# Patient Record
Sex: MALE | Race: WHITE | Employment: UNEMPLOYED | URBAN - METROPOLITAN AREA
[De-identification: names, ages, dates, MRNs, and addresses within clinical notes are randomized per-mention and may not be internally consistent; named-entity substitution may affect disease eponyms.]

---

## 2021-06-14 ENCOUNTER — OFFICE VISIT (OUTPATIENT)
Dept: GASTROENTEROLOGY | Age: 56
End: 2021-06-14
Payer: MEDICARE

## 2021-06-14 VITALS — HEART RATE: 124 BPM | SYSTOLIC BLOOD PRESSURE: 122 MMHG | RESPIRATION RATE: 18 BRPM | DIASTOLIC BLOOD PRESSURE: 81 MMHG

## 2021-06-14 DIAGNOSIS — Z12.11 COLON CANCER SCREENING: ICD-10-CM

## 2021-06-14 DIAGNOSIS — K22.10 ESOPHAGITIS, EROSIVE: Primary | ICD-10-CM

## 2021-06-14 PROBLEM — R33.9 RETENTION OF URINE: Status: ACTIVE | Noted: 2021-03-26

## 2021-06-14 PROBLEM — R62.7 FTT (FAILURE TO THRIVE) IN ADULT: Status: ACTIVE | Noted: 2019-04-01

## 2021-06-14 PROBLEM — F79 MENTALLY DISABLED: Status: ACTIVE | Noted: 2020-01-20

## 2021-06-14 PROBLEM — F20.89 OTHER SCHIZOPHRENIA (HCC): Status: ACTIVE | Noted: 2018-03-26

## 2021-06-14 PROBLEM — K92.2 GI BLEED: Status: ACTIVE | Noted: 2021-03-18

## 2021-06-14 PROBLEM — K59.00 CONSTIPATION: Status: ACTIVE | Noted: 2019-04-01

## 2021-06-14 PROBLEM — R29.6 FREQUENT FALLS: Status: ACTIVE | Noted: 2020-01-20

## 2021-06-14 PROBLEM — K20.90 ESOPHAGITIS: Status: ACTIVE | Noted: 2021-03-21

## 2021-06-14 PROCEDURE — G8421 BMI NOT CALCULATED: HCPCS | Performed by: INTERNAL MEDICINE

## 2021-06-14 PROCEDURE — 99214 OFFICE O/P EST MOD 30 MIN: CPT | Performed by: INTERNAL MEDICINE

## 2021-06-14 PROCEDURE — G8427 DOCREV CUR MEDS BY ELIG CLIN: HCPCS | Performed by: INTERNAL MEDICINE

## 2021-06-14 PROCEDURE — 3017F COLORECTAL CA SCREEN DOC REV: CPT | Performed by: INTERNAL MEDICINE

## 2021-06-14 PROCEDURE — 1036F TOBACCO NON-USER: CPT | Performed by: INTERNAL MEDICINE

## 2021-06-14 RX ORDER — SUCRALFATE ORAL 1 G/10ML
1000 SUSPENSION ORAL
COMMUNITY
Start: 2021-05-06

## 2021-06-14 RX ORDER — CLOZAPINE 100 MG/1
100 TABLET ORAL NIGHTLY
COMMUNITY
Start: 2021-03-04

## 2021-06-14 RX ORDER — ATENOLOL 25 MG/1
TABLET ORAL
COMMUNITY
Start: 2021-05-03 | End: 2021-10-25

## 2021-06-14 RX ORDER — LANOLIN ALCOHOL/MO/W.PET/CERES
325 CREAM (GRAM) TOPICAL 2 TIMES DAILY
COMMUNITY
Start: 2021-05-06 | End: 2021-10-25

## 2021-06-14 RX ORDER — M-VIT,TX,IRON,MINS/CALC/FOLIC 27MG-0.4MG
1 TABLET ORAL DAILY
COMMUNITY

## 2021-06-14 RX ORDER — LOVASTATIN 20 MG/1
20 TABLET ORAL DAILY
COMMUNITY
Start: 2020-09-01

## 2021-06-14 RX ORDER — BENZTROPINE MESYLATE 2 MG/1
2 TABLET ORAL 2 TIMES DAILY
COMMUNITY

## 2021-06-14 RX ORDER — PANTOPRAZOLE SODIUM 40 MG/1
40 TABLET, DELAYED RELEASE ORAL
COMMUNITY
Start: 2021-05-06 | End: 2021-10-25

## 2021-06-14 RX ORDER — TAMSULOSIN HYDROCHLORIDE 0.4 MG/1
CAPSULE ORAL
COMMUNITY
Start: 2021-05-13 | End: 2021-10-25

## 2021-06-14 RX ORDER — OMEPRAZOLE 20 MG/1
20 CAPSULE, DELAYED RELEASE ORAL DAILY
COMMUNITY

## 2021-06-14 ASSESSMENT — ENCOUNTER SYMPTOMS
RECTAL PAIN: 0
DIARRHEA: 0
TROUBLE SWALLOWING: 0
BLOOD IN STOOL: 0
CHOKING: 0
WHEEZING: 0
ANAL BLEEDING: 0
NAUSEA: 0
VOMITING: 1
CONSTIPATION: 0
COUGH: 0
ABDOMINAL DISTENTION: 0
ABDOMINAL PAIN: 0

## 2021-06-14 NOTE — PROGRESS NOTES
GI CLINIC FOLLOW UP    INTERVAL HISTORY:   No referring provider defined for this encounter. Chief Complaint   Patient presents with    GI Bleeding     Patient is f/u on hospital visit, EGD, GI bleed           HISTORY OF PRESENT ILLNESS: Chanell Alonzo is a 60 y.o. male , referred for evaluation of*severe esophagitis and GI bleed  Patient is MRDD not able to give any history  We have seen a month by [facility] hospital with anemia  GI bleeding  EGD was done as below showed severe grade 4 reflux esophagitis  Patient was treated with PPI and Carafate  His repeat CBC on 6/9/2021 showed that his hemoglobin right now is normal at 13.2  Patient is not able to give me history  But from the caregiver seems like he is not having any abdominal pain is not having any dysphagia or odynophagia has not had any sign of bleeding does not have any fever or chills      Labs 6/9/2022:   normal h/h 13.2 hgb       PREOPERATIVE DIAGNOSIS: CC coffee-ground emesis drop in hemoglobin    POSTOPERATIVE DIAGNOSIS: Severe esophagitis grade 4 reflux esophagitis biopsies were taken very fragile mucosa    OPERATION: Upper GI endoscopy with Biopsy    ANESTHESIA: Moderate Sedation     ESTIMATED BLOOD LOSS: Less than 50 ml    COMPLICATIONS: None. SPECIMENS: was obtained    HISTORY: The patient is a 60y.o. year old male with history of above preop diagnosis. I recommended esophagogastroduodenoscopy with possible biopsy and I explained the risk, benefits, expected outcome, and alternatives to the procedure. Risks included but are not limited to bleeding, infection, respiratory distress, hypotension, and perforation of the esophagus, stomach, or duodenum. Patient understands and is in agreement. Informed consent was obtained for the procedure, including sedation. Risks of perforation, hemorrhage, adverse drug reaction and aspiration were discussed. The patient was placed in the left lateral decubitus position.  Based on the pre-procedure assessment, including review of the patient's medical history, medications, allergies, and review of systems, he had been deemed to be an appropriate candidate for conscious sedation; he was therefore sedated with the medications listed below. The patient was monitored continuously with ECG tracing, pulse oximetry, blood pressure monitoring, and direct observations. PROCEDURE: The patient was given IV conscious sedation. The patient's SPO2 remained above 90% throughout the procedure. The gastroscope was inserted orally and advanced under direct vision through the esophagus, through the stomach, through the pylorus, and into the descending duodenum. Findings:    Retropharyngeal area was grossly normal appearing    Esophagus: abnormal: Severe esophagitis grade 4 reflux esophagitis biopsies were taken very fragile mucosa    Stomach:  Fundus: abnormal: Small hiatal hernia  Body: abnormal: Retain coffee-ground stuff  Antrum: normal    Duodenum:   Descending: normal  Bulb: normal    The scope was removed and the patient tolerated the procedure well. Recommendations/Plan:   1. F/U Biopsies  2. Proton pump inhibitor  3. Carafate  4. H&H monitoring  5. Iron check and supplement if needed  6. May be discharged back to the group home tomorrow of there is no bleeding    Electronically signed by Andres Wyatt MD on 3/19/2021 at 1:11 PM       Final Pathologic Diagnosis    Esophagus, biopsy:        Ulcer.        Comment: Immunohistochemical stain for CMV infection and GMS stain for fungal infection are negative with adequate control.                 **Report Electronically Signed Out**    rg/3/23/2021 Jina Garcia MD       Past Medical,Family, and Social History reviewed and does contribute to the patient presentingcondition. Patient's PMH/PSH,SH,PSYCH Hx, MEDs, ALLERGIES, and ROS were all reviewed and updated in the appropriate sections. PAST MEDICAL HISTORY:  History reviewed.  No pertinent past medical history. History reviewed. No pertinent surgical history.     CURRENT MEDICATIONS:    Current Outpatient Medications:     benztropine (COGENTIN) 2 MG tablet, Take 2 mg by mouth 2 times daily, Disp: , Rfl:     cloZAPine (CLOZARIL) 100 MG tablet, Take 100 mg by mouth nightly, Disp: , Rfl:     ferrous sulfate (FE TABS 325) 325 (65 Fe) MG EC tablet, Take 325 mg by mouth 2 times daily, Disp: , Rfl:     lovastatin (MEVACOR) 20 MG tablet, Take 20 mg by mouth daily, Disp: , Rfl:     sucralfate (CARAFATE) 1 GM/10ML suspension, Take 1,000 mg by mouth, Disp: , Rfl:     Multiple Vitamins-Minerals (THERAPEUTIC MULTIVITAMIN-MINERALS) tablet, Take 1 tablet by mouth daily, Disp: , Rfl:     omeprazole (PRILOSEC) 20 MG delayed release capsule, Take 20 mg by mouth daily, Disp: , Rfl:     atenolol (TENORMIN) 25 MG tablet, , Disp: , Rfl:     pantoprazole (PROTONIX) 40 MG tablet, Take 40 mg by mouth 2 times daily (before meals) (Patient not taking: Reported on 6/14/2021), Disp: , Rfl:     tamsulosin (FLOMAX) 0.4 MG capsule, , Disp: , Rfl:     ALLERGIES:   No Known Allergies    FAMILY HISTORY:       Family history unknown: Yes         SOCIAL HISTORY:   Social History     Socioeconomic History    Marital status: Single     Spouse name: Not on file    Number of children: Not on file    Years of education: Not on file    Highest education level: Not on file   Occupational History    Not on file   Tobacco Use    Smoking status: Former Smoker    Smokeless tobacco: Never Used   Vaping Use    Vaping Use: Never used   Substance and Sexual Activity    Alcohol use: Not Currently    Drug use: Not on file    Sexual activity: Not on file   Other Topics Concern    Not on file   Social History Narrative    Not on file     Social Determinants of Health     Financial Resource Strain:     Difficulty of Paying Living Expenses:    Food Insecurity:     Worried About Running Out of Food in the Last Year:     [employer] of [employer] in the Last Year:    Transportation Needs:     Lack of Transportation (Medical):  Lack of Transportation (Non-Medical):    Physical Activity:     Days of Exercise per Week:     Minutes of Exercise per Session:    Stress:     Feeling of Stress :    Social Connections:     Frequency of Communication with Friends and Family:     Frequency of Social Gatherings with Friends and Family:     Attends Zoroastrianism Services:     Active Member of Clubs or Organizations:     Attends Club or Organization Meetings:     Marital Status:    Intimate Partner Violence:     Fear of Current or Ex-Partner:     Emotionally Abused:     Physically Abused:     Sexually Abused:        REVIEW OF SYSTEMS: A 12-point review of systemswas obtained and pertinent positives and negatives were enumerated above in the history of present illness. All other reviewed systems / symptoms were negative. Review of Systems   Constitutional: Negative for appetite change, fatigue and unexpected weight change. HENT: Negative for trouble swallowing. Respiratory: Negative for cough, choking and wheezing. Cardiovascular: Negative for chest pain, palpitations and leg swelling. Gastrointestinal: Positive for vomiting. Negative for abdominal distention, abdominal pain, anal bleeding, blood in stool, constipation, diarrhea, nausea and rectal pain. Genitourinary: Negative for difficulty urinating. Allergic/Immunologic: Negative for environmental allergies and food allergies. Neurological: Negative for dizziness, weakness, light-headedness, numbness and headaches. Hematological: Does not bruise/bleed easily. Psychiatric/Behavioral: Negative for sleep disturbance. The patient is nervous/anxious.             LABORATORY DATA: Reviewed  No results found for: WBC, HGB, HCT, MCV, PLT, NA, K, CL, CO2, BUN, CREATININE, LABPROT, LABALBU, GGT, BILITOT, ALKPHOS, AST, ALT, INR      No results found for: RBC, HGB, MCV, MCH, MCHC, RDW, MPV, continue to watch H&H carefully  Proceed with an EGD confirm eradication of the severe esophagitis he has  We will schedule him for colonoscopy screening    Diet/life style/natural hx /complication of the dx were all explained in details   Past medical, past surgical, social history, psychiatric history, medications or allergies, all reviewed and  updated    Thank you for allowing me to participate in the care of Mr. Damaris Hendrix. For any further questions please do not hesitate to contact me. I have reviewed and agree with the ROS entered by the MA/RN. Note is dictated utilizing voice recognition software. Unfortunately this leads to occasional typographical errors. Please contact our office if you have any questions.       Funmi Seo MD  [facility]  O: #[phone]

## 2021-06-16 ENCOUNTER — TELEPHONE (OUTPATIENT)
Dept: GASTROENTEROLOGY | Age: 56
End: 2021-06-16

## 2021-06-16 RX ORDER — POLYETHYLENE GLYCOL 3350, SODIUM SULFATE ANHYDROUS, SODIUM BICARBONATE, SODIUM CHLORIDE, POTASSIUM CHLORIDE 236; 22.74; 6.74; 5.86; 2.97 G/4L; G/4L; G/4L; G/4L; G/4L
4 POWDER, FOR SOLUTION ORAL ONCE
Qty: 4000 ML | Refills: 0 | Status: SHIPPED | OUTPATIENT
Start: 2021-06-16 | End: 2021-06-16

## 2021-06-16 NOTE — TELEPHONE ENCOUNTER
Writer spoke to Billy from [facility] in regards to scheduling EGD/colon proc that was ordered at [address] on 6/14/21. Writer confirmed w/ Billy pt is not on any blood thinners, has no kidney disease and pt has been vaccinated for the Covid-19 virus. Billy states to just schedule procedures and fax over information and they will set everything up. She asks that we fax to two fax numbers: [phone]. Pt is sched w/ Igne at [facility] Monday 7/19/21 @ 9:15am proc time, 7:45am arrival time. Golytely order will be faxed with bowel prep instructions. Pt will need  and someone to accompany him to procedure. Pt also to bring proof of Covid-19 vaccinations on the day of procedure. All of this is noted on bowel prep instructions and is faxed to care facility.

## 2021-07-06 NOTE — TELEPHONE ENCOUNTER
Lia Jackson at [facility] asked for a return call needing the procedure time for Merna Getting to set up transportation. She can be reached at [phone].

## 2021-07-15 ENCOUNTER — ANESTHESIA EVENT (OUTPATIENT)
Dept: OPERATING ROOM | Age: 56
End: 2021-07-15
Payer: MEDICARE

## 2021-07-19 ENCOUNTER — HOSPITAL ENCOUNTER (OUTPATIENT)
Age: 56
Setting detail: OUTPATIENT SURGERY
Discharge: OTHER INSTITUTION | End: 2021-07-19
Attending: INTERNAL MEDICINE | Admitting: INTERNAL MEDICINE
Payer: MEDICARE

## 2021-07-19 ENCOUNTER — ANESTHESIA (OUTPATIENT)
Dept: OPERATING ROOM | Age: 56
End: 2021-07-19
Payer: MEDICARE

## 2021-07-19 VITALS
DIASTOLIC BLOOD PRESSURE: 76 MMHG | SYSTOLIC BLOOD PRESSURE: 126 MMHG | HEIGHT: 66 IN | OXYGEN SATURATION: 98 % | WEIGHT: 149.4 LBS | RESPIRATION RATE: 16 BRPM | TEMPERATURE: 97 F | BODY MASS INDEX: 24.01 KG/M2 | HEART RATE: 81 BPM

## 2021-07-19 VITALS
DIASTOLIC BLOOD PRESSURE: 76 MMHG | OXYGEN SATURATION: 99 % | RESPIRATION RATE: 23 BRPM | SYSTOLIC BLOOD PRESSURE: 119 MMHG | TEMPERATURE: 96.8 F

## 2021-07-19 PROCEDURE — 7100000001 HC PACU RECOVERY - ADDTL 15 MIN: Performed by: INTERNAL MEDICINE

## 2021-07-19 PROCEDURE — 3700000001 HC ADD 15 MINUTES (ANESTHESIA): Performed by: INTERNAL MEDICINE

## 2021-07-19 PROCEDURE — 7100000011 HC PHASE II RECOVERY - ADDTL 15 MIN: Performed by: INTERNAL MEDICINE

## 2021-07-19 PROCEDURE — 3609012400 HC EGD TRANSORAL BIOPSY SINGLE/MULTIPLE: Performed by: INTERNAL MEDICINE

## 2021-07-19 PROCEDURE — 3700000000 HC ANESTHESIA ATTENDED CARE: Performed by: INTERNAL MEDICINE

## 2021-07-19 PROCEDURE — 88305 TISSUE EXAM BY PATHOLOGIST: CPT

## 2021-07-19 PROCEDURE — 7100000000 HC PACU RECOVERY - FIRST 15 MIN: Performed by: INTERNAL MEDICINE

## 2021-07-19 PROCEDURE — 43239 EGD BIOPSY SINGLE/MULTIPLE: CPT | Performed by: INTERNAL MEDICINE

## 2021-07-19 PROCEDURE — G0121 COLON CA SCRN NOT HI RSK IND: HCPCS | Performed by: INTERNAL MEDICINE

## 2021-07-19 PROCEDURE — 2580000003 HC RX 258: Performed by: ANESTHESIOLOGY

## 2021-07-19 PROCEDURE — 3609027000 HC COLONOSCOPY: Performed by: INTERNAL MEDICINE

## 2021-07-19 PROCEDURE — 88342 IMHCHEM/IMCYTCHM 1ST ANTB: CPT

## 2021-07-19 PROCEDURE — 2500000003 HC RX 250 WO HCPCS: Performed by: ANESTHESIOLOGY

## 2021-07-19 PROCEDURE — 6360000002 HC RX W HCPCS: Performed by: ANESTHESIOLOGY

## 2021-07-19 PROCEDURE — 2709999900 HC NON-CHARGEABLE SUPPLY: Performed by: INTERNAL MEDICINE

## 2021-07-19 PROCEDURE — 7100000010 HC PHASE II RECOVERY - FIRST 15 MIN: Performed by: INTERNAL MEDICINE

## 2021-07-19 RX ORDER — HYDROCODONE BITARTRATE AND ACETAMINOPHEN 5; 325 MG/1; MG/1
1 TABLET ORAL PRN
Status: DISCONTINUED | OUTPATIENT
Start: 2021-07-19 | End: 2021-07-19 | Stop reason: HOSPADM

## 2021-07-19 RX ORDER — SODIUM CHLORIDE 9 MG/ML
25 INJECTION, SOLUTION INTRAVENOUS PRN
Status: DISCONTINUED | OUTPATIENT
Start: 2021-07-19 | End: 2021-07-19 | Stop reason: HOSPADM

## 2021-07-19 RX ORDER — MEPERIDINE HYDROCHLORIDE 50 MG/ML
12.5 INJECTION INTRAMUSCULAR; INTRAVENOUS; SUBCUTANEOUS EVERY 5 MIN PRN
Status: DISCONTINUED | OUTPATIENT
Start: 2021-07-19 | End: 2021-07-19 | Stop reason: HOSPADM

## 2021-07-19 RX ORDER — LIDOCAINE HYDROCHLORIDE 10 MG/ML
1 INJECTION, SOLUTION EPIDURAL; INFILTRATION; INTRACAUDAL; PERINEURAL
Status: DISCONTINUED | OUTPATIENT
Start: 2021-07-19 | End: 2021-07-19 | Stop reason: HOSPADM

## 2021-07-19 RX ORDER — DIPHENHYDRAMINE HYDROCHLORIDE 50 MG/ML
12.5 INJECTION INTRAMUSCULAR; INTRAVENOUS
Status: DISCONTINUED | OUTPATIENT
Start: 2021-07-19 | End: 2021-07-19 | Stop reason: HOSPADM

## 2021-07-19 RX ORDER — SODIUM CHLORIDE, SODIUM LACTATE, POTASSIUM CHLORIDE, CALCIUM CHLORIDE 600; 310; 30; 20 MG/100ML; MG/100ML; MG/100ML; MG/100ML
INJECTION, SOLUTION INTRAVENOUS CONTINUOUS PRN
Status: DISCONTINUED | OUTPATIENT
Start: 2021-07-19 | End: 2021-07-19 | Stop reason: SDUPTHER

## 2021-07-19 RX ORDER — SODIUM CHLORIDE, SODIUM LACTATE, POTASSIUM CHLORIDE, CALCIUM CHLORIDE 600; 310; 30; 20 MG/100ML; MG/100ML; MG/100ML; MG/100ML
INJECTION, SOLUTION INTRAVENOUS CONTINUOUS
Status: DISCONTINUED | OUTPATIENT
Start: 2021-07-19 | End: 2021-07-19 | Stop reason: HOSPADM

## 2021-07-19 RX ORDER — SODIUM CHLORIDE 0.9 % (FLUSH) 0.9 %
10 SYRINGE (ML) INJECTION EVERY 12 HOURS SCHEDULED
Status: DISCONTINUED | OUTPATIENT
Start: 2021-07-19 | End: 2021-07-19 | Stop reason: HOSPADM

## 2021-07-19 RX ORDER — ONDANSETRON 2 MG/ML
4 INJECTION INTRAMUSCULAR; INTRAVENOUS
Status: DISCONTINUED | OUTPATIENT
Start: 2021-07-19 | End: 2021-07-19 | Stop reason: HOSPADM

## 2021-07-19 RX ORDER — PROPOFOL 10 MG/ML
INJECTION, EMULSION INTRAVENOUS PRN
Status: DISCONTINUED | OUTPATIENT
Start: 2021-07-19 | End: 2021-07-19 | Stop reason: SDUPTHER

## 2021-07-19 RX ORDER — SODIUM CHLORIDE 0.9 % (FLUSH) 0.9 %
10 SYRINGE (ML) INJECTION PRN
Status: DISCONTINUED | OUTPATIENT
Start: 2021-07-19 | End: 2021-07-19 | Stop reason: HOSPADM

## 2021-07-19 RX ORDER — HYDROCODONE BITARTRATE AND ACETAMINOPHEN 5; 325 MG/1; MG/1
2 TABLET ORAL PRN
Status: DISCONTINUED | OUTPATIENT
Start: 2021-07-19 | End: 2021-07-19 | Stop reason: HOSPADM

## 2021-07-19 RX ORDER — PROMETHAZINE HYDROCHLORIDE 25 MG/ML
6.25 INJECTION, SOLUTION INTRAMUSCULAR; INTRAVENOUS
Status: DISCONTINUED | OUTPATIENT
Start: 2021-07-19 | End: 2021-07-19 | Stop reason: HOSPADM

## 2021-07-19 RX ORDER — FENTANYL CITRATE 50 UG/ML
25 INJECTION, SOLUTION INTRAMUSCULAR; INTRAVENOUS EVERY 5 MIN PRN
Status: DISCONTINUED | OUTPATIENT
Start: 2021-07-19 | End: 2021-07-19 | Stop reason: HOSPADM

## 2021-07-19 RX ORDER — LIDOCAINE HYDROCHLORIDE 10 MG/ML
INJECTION, SOLUTION EPIDURAL; INFILTRATION; INTRACAUDAL; PERINEURAL PRN
Status: DISCONTINUED | OUTPATIENT
Start: 2021-07-19 | End: 2021-07-19 | Stop reason: SDUPTHER

## 2021-07-19 RX ORDER — HYDRALAZINE HYDROCHLORIDE 20 MG/ML
5 INJECTION INTRAMUSCULAR; INTRAVENOUS EVERY 10 MIN PRN
Status: DISCONTINUED | OUTPATIENT
Start: 2021-07-19 | End: 2021-07-19 | Stop reason: HOSPADM

## 2021-07-19 RX ORDER — BUSPIRONE HYDROCHLORIDE 10 MG/1
10 TABLET ORAL 2 TIMES DAILY
COMMUNITY

## 2021-07-19 RX ORDER — MORPHINE SULFATE 1 MG/ML
1 INJECTION, SOLUTION EPIDURAL; INTRATHECAL; INTRAVENOUS EVERY 5 MIN PRN
Status: DISCONTINUED | OUTPATIENT
Start: 2021-07-19 | End: 2021-07-19 | Stop reason: HOSPADM

## 2021-07-19 RX ADMIN — PROPOFOL INJECTABLE EMULSION 30 MG: 10 INJECTION, EMULSION INTRAVENOUS at 09:53

## 2021-07-19 RX ADMIN — PROPOFOL INJECTABLE EMULSION 30 MG: 10 INJECTION, EMULSION INTRAVENOUS at 09:59

## 2021-07-19 RX ADMIN — PROPOFOL INJECTABLE EMULSION 30 MG: 10 INJECTION, EMULSION INTRAVENOUS at 10:08

## 2021-07-19 RX ADMIN — PROPOFOL INJECTABLE EMULSION 30 MG: 10 INJECTION, EMULSION INTRAVENOUS at 10:05

## 2021-07-19 RX ADMIN — LIDOCAINE HYDROCHLORIDE 50 MG: 10 INJECTION, SOLUTION EPIDURAL; INFILTRATION; INTRACAUDAL; PERINEURAL at 09:52

## 2021-07-19 RX ADMIN — PROPOFOL INJECTABLE EMULSION 30 MG: 10 INJECTION, EMULSION INTRAVENOUS at 10:20

## 2021-07-19 RX ADMIN — PROPOFOL INJECTABLE EMULSION 30 MG: 10 INJECTION, EMULSION INTRAVENOUS at 10:11

## 2021-07-19 RX ADMIN — PROPOFOL INJECTABLE EMULSION 30 MG: 10 INJECTION, EMULSION INTRAVENOUS at 10:17

## 2021-07-19 RX ADMIN — PROPOFOL INJECTABLE EMULSION 100 MG: 10 INJECTION, EMULSION INTRAVENOUS at 09:52

## 2021-07-19 RX ADMIN — PROPOFOL INJECTABLE EMULSION 30 MG: 10 INJECTION, EMULSION INTRAVENOUS at 10:02

## 2021-07-19 RX ADMIN — PROPOFOL INJECTABLE EMULSION 30 MG: 10 INJECTION, EMULSION INTRAVENOUS at 09:56

## 2021-07-19 RX ADMIN — SODIUM CHLORIDE, POTASSIUM CHLORIDE, SODIUM LACTATE AND CALCIUM CHLORIDE: 600; 310; 30; 20 INJECTION, SOLUTION INTRAVENOUS at 09:47

## 2021-07-19 RX ADMIN — PROPOFOL INJECTABLE EMULSION 30 MG: 10 INJECTION, EMULSION INTRAVENOUS at 10:14

## 2021-07-19 ASSESSMENT — PULMONARY FUNCTION TESTS
PIF_VALUE: 1
PIF_VALUE: 2
PIF_VALUE: 1
PIF_VALUE: 2
PIF_VALUE: 1

## 2021-07-19 NOTE — ANESTHESIA PRE PROCEDURE
Department of Anesthesiology  Preprocedure Note       Name:  Oni Fine   Age:  60 y.o.  :  1965                                          MRN:  [id number]         Date:  2021      Surgeon: Zelalem Hathaway):  Ivette Trivedi MD    Procedure: Procedure(s):  EGD BIOPSY  COLORECTAL CANCER SCREENING, NOT HIGH RISK    Medications prior to admission:   Prior to Admission medications    Medication Sig Start Date End Date Taking? Authorizing Provider   atenolol (TENORMIN) 25 MG tablet  5/3/21   Historical Provider, MD   benztropine (COGENTIN) 2 MG tablet Take 2 mg by mouth 2 times daily    Historical Provider, MD   cloZAPine (CLOZARIL) 100 MG tablet Take 100 mg by mouth nightly 3/4/21   Historical Provider, MD   ferrous sulfate (FE TABS 325) 325 (65 Fe) MG EC tablet Take 325 mg by mouth 2 times daily 21   Historical Provider, MD   lovastatin (MEVACOR) 20 MG tablet Take 20 mg by mouth daily 20   Historical Provider, MD   pantoprazole (PROTONIX) 40 MG tablet Take 40 mg by mouth 2 times daily (before meals)  Patient not taking: Reported on 2021   Historical Provider, MD   sucralfate (CARAFATE) 1 GM/10ML suspension Take 1,000 mg by mouth 21   Historical Provider, MD   tamsulosin (FLOMAX) 0.4 MG capsule  21   Historical Provider, MD   Multiple Vitamins-Minerals (THERAPEUTIC MULTIVITAMIN-MINERALS) tablet Take 1 tablet by mouth daily    Historical Provider, MD   omeprazole (PRILOSEC) 20 MG delayed release capsule Take 20 mg by mouth daily    Historical Provider, MD       Current medications:    No current facility-administered medications for this encounter.        Allergies:  No Known Allergies    Problem List:    Patient Active Problem List   Diagnosis Code    Constipation K59.00    Esophagitis K20.90    GI bleed K92.2    Mentally disabled F79    Other schizophrenia ([facility] [address] F20.89    Retention of urine R33.9    FTT (failure to thrive) in adult R62.7    Frequent falls R29.6       Past Medical History:        Diagnosis Date    Dementia ([facility] [address] [facility]     GERD (gastroesophageal reflux disease)     Hyperlipidemia     Hypertension     Schizophrenia with prominent negative symptoms ([facility] [address] [facility]        Past Surgical History:  History reviewed. No pertinent surgical history. Social History:    Social History     Tobacco Use    Smoking status: Former Smoker    Smokeless tobacco: Never Used   Substance Use Topics    Alcohol use: Not Currently                                Counseling given: Not Answered      Vital Signs (Current): There were no vitals filed for this visit. BP Readings from Last 3 Encounters:   06/14/21 122/81       NPO Status: Time of last liquid consumption: 2000                        Time of last solid consumption: 1800                        Date of last liquid consumption: 07/18/21                        Date of last solid food consumption: 07/18/21    BMI:   Wt Readings from Last 3 Encounters:   No data found for Wt     There is no height or weight on file to calculate BMI.    CBC: No results found for: WBC, RBC, HGB, HCT, MCV, RDW, PLT    CMP: No results found for: NA, K, CL, CO2, BUN, CREATININE, GFRAA, AGRATIO, LABGLOM, GLUCOSE, PROT, CALCIUM, BILITOT, ALKPHOS, AST, ALT    POC Tests: No results for input(s): POCGLU, POCNA, POCK, POCCL, POCBUN, POCHEMO, POCHCT in the last 72 hours.     Coags: No results found for: PROTIME, INR, APTT    HCG (If Applicable): No results found for: PREGTESTUR, PREGSERUM, HCG, HCGQUANT     ABGs: No results found for: PHART, PO2ART, ZPK5TOL, ITW1FTY, BEART, B2NZDOZT     Type & Screen (If Applicable):  No results found for: LABABO, LABRH    Drug/Infectious Status (If Applicable):  No results found for: HIV, HEPCAB    COVID-19 Screening (If Applicable): No results found for: COVID19        Anesthesia Evaluation  Patient summary reviewed and Nursing notes reviewed no history of anesthetic complications:   Airway: Mallampati: II  TM distance: >3 FB   Neck ROM: full  Mouth opening: > = 3 FB Dental:          Pulmonary:Negative Pulmonary ROS and normal exam  breath sounds clear to auscultation                             Cardiovascular:    (+) hypertension:, hyperlipidemia        Rhythm: regular  Rate: normal           Beta Blocker:  Dose within 24 Hrs         Neuro/Psych:   (+) psychiatric history:             ROS comment: Mentally disabled  Other schizophrenia (     GI/Hepatic/Renal:   (+) GERD:, bowel prep,           Endo/Other: Negative Endo/Other ROS                    Abdominal:       Abdomen: soft. Vascular: negative vascular ROS. Other Findings:             Anesthesia Plan      MAC     ASA 3             Anesthetic plan and risks discussed with patient and legal guardian. Plan discussed with CRNA.                   Lindsay Vargas MD   7/19/2021

## 2021-07-19 NOTE — H&P
Procedure History and Physical    Pre-Procedural Diagnosis:    F/u erosive esophagitis   Colon screening     Indications:  same    Procedure Planned: endoscopy and colonoscopy     History Obtained From:  patient    HISTORY OF PRESENT ILLNESS:       The patient is a 60 y.o. male who presents for the above procedure. Past Medical History:    Past Medical History:   Diagnosis Date    GERD (gastroesophageal reflux disease)     Hyperlipidemia        Past Surgical History:    No past surgical history on file. Medications:  No current facility-administered medications for this encounter. Allergies:   No Known Allergies              Social   Social History     Tobacco Use    Smoking status: Former Smoker    Smokeless tobacco: Never Used   Substance Use Topics    Alcohol use: Not Currently        PSYCH HISTORY:  Depression No  Anxiety No  Suicide No       Family History   Family history unknown: Yes      No family history of colon cancer, Crohn's disease, or ulcerative colitis    Problems with Sedation/Anesthesia in the past? no    REVIEW OF SYSTEMS:  12 point review of systems negative other than mentioned above. PHYSICAL EXAM:    Vitals: There were no vitals taken for this visit. Focused Exam related to procedure:    General appearance: NAD, conversant   Eyes: anicteric sclerae, moist conjunctivae; no lid-lag; PERRLA   Lungs: CTA, with normal respiratory effort and no intercostal retractions   CV: RRR, no MRGs   Abdomen: Soft, non-tender; no masses or HSM   Skin: Normal temperature, turgor and texture; no rash, ulcers or subcutaneous nodules     DATA:  CBC: No results found for: WBC, HGB, HCT, MCV, PLT  BUN/Cr: No results found for: BUN, No results found for: CREATININE  Potassium: No results found for: K  PT/INR: No results found for: INR, PROTIME    ASSESSMENT AND PLAN:       1. Patient is a 60 y.o. male with above specified procedure planned. Expected Sedation/Anesthesia Type: MAC    2. ASA ([address] Anesthesiology) Anesthesia Status: Class 1 - A normal healthy patient    3. Mallampati: I (soft palate, uvula, fauces, tonsillar pillars visible)  4. Procedure options, risks and benefits reviewed with Patient. Patient expresses understanding.     5.  Consent has been signed:  Yes    German Larkin MD

## 2021-07-19 NOTE — ANESTHESIA POSTPROCEDURE EVALUATION
Department of Anesthesiology  Postprocedure Note    Patient: Geraldo Estrada  MRN: [id number]  [place]: 1965  Date of evaluation: 7/19/2021  Time:  10:25 AM     Procedure Summary     Date: 07/19/21 Room / Location: [facility] 4777 / [facility]    Anesthesia Start: 3489 Anesthesia Stop: 1024    Procedures:       EGD BIOPSY SMALL BOWEL AND STOMACH AND GE JUNCTION (N/A )      COLORECTAL CANCER SCREENING TO HEPATIC FLEXURE (N/A ) Diagnosis: (EROSIVE ESOPHAGITIS / COLON SCREEN)    Surgeons: Cindy Napoles MD Responsible Provider: Soto Bell MD    Anesthesia Type: MAC ASA Status: 3          Anesthesia Type: MAC    Joni Phase I: Joni Score: 10    Joni Phase II:      Last vitals: Reviewed and per EMR flowsheets.        Anesthesia Post Evaluation    Patient location during evaluation: PACU  Patient participation: complete - patient participated  Level of consciousness: awake and alert  Airway patency: patent  Nausea & Vomiting: no nausea and no vomiting  Complications: no  Cardiovascular status: hemodynamically stable  Respiratory status: nasal cannula and spontaneous ventilation  Hydration status: euvolemic

## 2021-07-19 NOTE — OP NOTE
Operative Note  PROCEDURE NOTE    DATE OF PROCEDURE: 7/19/2021     SURGEON: Santo Mcdaniel MD  Facility: [facility]   ASSISTANT: None  Anesthesia: mac   PREOPERATIVE DIAGNOSIS:   erosive esophagitis     Diagnosis:  Irregular Z line , no esophagitis, bxs taken   ' gastritis, bxs taken    edema , small bowel  , bxs taken     POSTOPERATIVE DIAGNOSIS: As described below    OPERATION: Upper GI endoscopy with Biopsy    ANESTHESIA: Moderate Sedation     ESTIMATED BLOOD LOSS: Less than 50 ml    COMPLICATIONS: None. SPECIMENS:  Was Obtained:     As above     HISTORY: The patient is a 60y.o. year old male with history of above preop diagnosis. I recommended esophagogastroduodenoscopy with possible biopsy and I explained the risk, benefits, expected outcome, and alternatives to the procedure. Risks included but are not limited to bleeding, infection, respiratory distress, hypotension, and perforation of the esophagus, stomach, or duodenum. Patient understands and is in agreement. The patient was counseled at length about the risks of francis Covid-19 during their perioperative period and any recovery window from their procedure. The patient was made aware that francis Covid-19  may worsen their prognosis for recovering from their procedure  and lend to a higher morbidity and/or mortality risk. All material risks, benefits, and reasonable alternatives including postponing the procedure were discussed. The patient does wish to proceed with the procedure at this time. PROCEDURE: The patient was given IV conscious sedation. The patient's SPO2 remained above 90% throughout the procedure. The gastroscope was inserted orally and advanced under direct vision through the esophagus, through the stomach, through the pylorus, and into the descending duodenum. Post sedation note : The patient's SPO2 remained above 90% throughout the procedure. the vital signs remained stable , and no immediate complication form the procedure noted, patient will be ready for d/c when criteria is met . Findings:    Retropharyngeal area was grossly normal appearing    Esophagus: abnormal: Irregular Z line , no esophagitis, bxs taken     Stomach:    Fundus: normal    Body: abnormal: ' gastritis, bxs taken     Antrum: abnormal: gastritis, bxs taken     Duodenum:     Descending: abnormal:  edema , small bowel  , bxs taken     Bulb: abnormal:  edema , small bowel  , bxs taken     The scope was removed and the patient tolerated the procedure well. Recommendations/Plan:   1. F/U Biopsies  2. F/U In Office in 3-4 weeks  3.  Discussed with the family    Electronically signed by Santo Mcdaniel MD  on 7/19/2021 at 9:58 AM

## 2021-07-19 NOTE — OP NOTE
Operative Note  PROCEDURE NOTE    DATE OF PROCEDURE: 7/19/2021    SURGEON: Zahira Velazquez MD  Facility : [facility]   ASSISTANT: None  Anesthesia: MAC  PREOPERATIVE DIAGNOSIS:   Screening    POSTOPERATIVE DIAGNOSIS: as described below    OPERATION: Total colonoscopy     ANESTHESIA: Moderate Sedation    ESTIMATED BLOOD LOSS: less than 50     COMPLICATIONS: None. SPECIMENS:  Was Not Obtained    HISTORY: The patient is a 60y.o. year old male with history of above preop diagnosis. I recommended colonoscopy with possible biopsy or polypectomy and I explained the risk, benefits, expected outcome, and alternatives to the procedure. Risks included but are not limited to bleeding, infection, respiratory distress, hypotension, and perforation of the colon and possibility of missing a lesion. The patient understands and is in agreement. The patient was counseled at length about the risks of francis Covid-19 during their perioperative period and any recovery window from their procedure. The patient was made aware that francis Covid-19  may worsen their prognosis for recovering from their procedure  and lend to a higher morbidity and/or mortality risk. All material risks, benefits, and reasonable alternatives including postponing the procedure were discussed. The patient does wish to proceed with the procedure at this time. PROCEDURE: The patient was given IV conscious sedation. The patient's SPO2 remained above 90% throughout the procedure. The colonoscope was inserted per rectum and advanced under direct vision to the hepatic flexure I can see the cecum is full with stool for which I just aborted this exam I could not clean    Post sedation note : The patient's SPO2 remained above 90% throughout the procedure. the vital signs remained stable , and no immediate complication form the procedure noted, patient will be ready for d/c when criteria is met . The prep was inadequate.   Very

## 2021-07-20 NOTE — TELEPHONE ENCOUNTER
Rec'd call from Braeden OLIVER, at [facility] in [place]. She states pt did not have colon/egd completed yesterday due to lack of consent form. She would like consent form faxed to [phone]. Called [facility] and confirmed pt did have procedure completed on 7/19/21 and a consent form is not needed. Called Braeden Eagle back and M informing her of this.

## 2021-07-22 LAB — SURGICAL PATHOLOGY REPORT: NORMAL

## 2021-07-30 RX ORDER — POLYETHYLENE GLYCOL 3350 17 G/17G
POWDER, FOR SOLUTION ORAL
Qty: 238 G | Refills: 0 | Status: SHIPPED | OUTPATIENT
Start: 2021-07-30 | End: 2021-12-03 | Stop reason: ALTCHOICE

## 2021-07-30 NOTE — TELEPHONE ENCOUNTER
Writer returned phone call and spoke to Arlen from [facility] in regards to scheduling repeat colon w/ 2 day prep. Pt is sched esme/ Inge at [facility] Mon 10/11/21 @ 9:30am proc time, 8am arrival time. Arlen instructed pt will need a  and will need someone to accompany him on day of proc. Bowel prep instructions given to Arlen over the phone and hard copy faxed to [phone]. Bowel prep orders will be sent to [facility] in [place], [place]. Cary instructed pt will need to bring proof of covid-19 vaccinations. Amy Little voices her understanding.

## 2021-10-09 ENCOUNTER — TELEPHONE (OUTPATIENT)
Dept: GASTROENTEROLOGY | Age: 56
End: 2021-10-09

## 2021-10-09 NOTE — TELEPHONE ENCOUNTER
[phone] Sara from [facility]  1965. Patient is scheduled to have a colonoscopy on Monday 10/11/21 at 9:30am at the [facility] with Dr. Bassam Britton. Titus Akbar, care giver at [facility] called to cancel procedure due to transportation and scheduling conflicts. Patient will reschedule procedure at a later date.

## 2021-10-11 NOTE — TELEPHONE ENCOUNTER
Sara at [facility] called iraida and r/s pt's procedure.  She can be reached at [phone]

## 2021-10-14 NOTE — TELEPHONE ENCOUNTER
Writer returned [employer] call and spoke to Rachel Trotter in regards to rescheduling repeat colon proc. Pt is resched w/ [facility] at [address] [place] [address] 11/4/21 @ 12:15pm proc time, 10:15am arrival time. Bowel prep instructions w/ new dates given to Rachel Trotter over the phone. She is reminded of pt needing a  and to bring proof of Covid-19 vaccinations. PAT phone call is sched 10/25/21 @ Bassem Evans voices her understanding.

## 2021-10-25 ENCOUNTER — HOSPITAL ENCOUNTER (OUTPATIENT)
Dept: PREADMISSION TESTING | Age: 56
Discharge: HOME | End: 2021-10-29
Payer: COMMERCIAL

## 2021-10-25 NOTE — PROGRESS NOTES
Spoke with Von Torres several times regarding Ceclia Rushing. Pre op Instructions were faxed to [facility] [place] did confirm getting paperwork. Instructed Von Torres on arival time & confirmed bowel prep. Also requested most recent labs. Called [facility] for EKG from 3/2021.

## 2021-10-25 NOTE — PROGRESS NOTES

## 2021-11-04 NOTE — TELEPHONE ENCOUNTER
Cristin Montalvo from [facility] called and left msg on ofc vm 11/4/21 @ 8:41am cancelling proc scheduled for today due to pt non compliant and not NPO.   Return call to [phone]

## 2021-11-08 NOTE — TELEPHONE ENCOUNTER
Writer returned [employer] and spoke to Gloria Mandel from [facility] in regards to rescheduling pt's repeat colon proc and does she think pt will be compliant w/ a 2 day CLD and not eat. Gloria Mandel states she believes the pt will comply and wanted to reschedule. Writer informed Gloria Mandel that [facility] is on Monday for procedures and bowel prep would be over the weekend before. Writer asked Gloria Mandel if they would be able to keep pt on bowel prep over the weekend and Gloria Mandel was not sure. She states that we speak w/ [facility] in regards to scheduling and she took writer's phone number and name & will forward msg to [facility] and have her call us.

## 2021-12-07 ENCOUNTER — TELEPHONE (OUTPATIENT)
Dept: GASTROENTEROLOGY | Age: 56
End: 2021-12-07

## 2021-12-07 NOTE — TELEPHONE ENCOUNTER
Called [facility] and [facility] transferred me to the patient's nurse and nobody came back to the phone. Mailing no show letter.

## 2023-07-17 ENCOUNTER — APPOINTMENT (OUTPATIENT)
Dept: CT IMAGING | Age: 58
End: 2023-07-17
Attending: FAMILY MEDICINE
Payer: COMMERCIAL

## 2023-07-17 ENCOUNTER — HOSPITAL ENCOUNTER (OUTPATIENT)
Dept: CT IMAGING | Age: 58
Discharge: HOME | End: 2023-07-19
Attending: FAMILY MEDICINE
Payer: COMMERCIAL

## 2023-07-17 DIAGNOSIS — J39.8 MASS OF TRACHEA: ICD-10-CM

## 2023-07-17 PROCEDURE — 71250 CT THORAX DX C-: CPT

## 2024-03-08 ENCOUNTER — TRANSCRIBE ORDERS (OUTPATIENT)
Dept: ADMINISTRATIVE | Age: 59
End: 2024-03-08

## 2024-03-08 DIAGNOSIS — R49.0 HOARSENESS OF VOICE: ICD-10-CM

## 2024-03-08 DIAGNOSIS — R13.12 DYSPHAGIA, OROPHARYNGEAL PHASE: Primary | ICD-10-CM

## 2024-03-25 ENCOUNTER — HOSPITAL ENCOUNTER (OUTPATIENT)
Dept: CT IMAGING | Age: 59
Discharge: HOME | End: 2024-03-27
Attending: FAMILY MEDICINE
Payer: COMMERCIAL

## 2024-03-25 DIAGNOSIS — J39.8 MASS OF TRACHEA: ICD-10-CM

## 2024-03-25 PROCEDURE — 2580000003 HC RX 258: Performed by: FAMILY MEDICINE

## 2024-03-25 PROCEDURE — 70491 CT SOFT TISSUE NECK W/DYE: CPT

## 2024-03-25 PROCEDURE — 6360000004 HC RX CONTRAST MEDICATION: Performed by: FAMILY MEDICINE

## 2024-03-25 RX ORDER — 0.9 % SODIUM CHLORIDE 0.9 %
80 INTRAVENOUS SOLUTION INTRAVENOUS ONCE
Status: COMPLETED | OUTPATIENT
Start: 2024-03-25 | End: 2024-03-25

## 2024-03-25 RX ORDER — SODIUM CHLORIDE 0.9 % (FLUSH) 0.9 %
10 SYRINGE (ML) INJECTION PRN
Status: DISCONTINUED | OUTPATIENT
Start: 2024-03-25 | End: 2024-03-28 | Stop reason: HOSPADM

## 2024-03-25 RX ADMIN — SODIUM CHLORIDE 80 ML: 9 INJECTION, SOLUTION INTRAVENOUS at 08:23

## 2024-03-25 RX ADMIN — IOPAMIDOL 75 ML: 755 INJECTION, SOLUTION INTRAVENOUS at 08:22

## 2024-03-25 RX ADMIN — SODIUM CHLORIDE, PRESERVATIVE FREE 10 ML: 5 INJECTION INTRAVENOUS at 08:23

## 2025-01-01 ENCOUNTER — HOSPITAL ENCOUNTER (INPATIENT)
Age: 60
LOS: 8 days | Discharge: HOME | End: 2025-01-10
Attending: EMERGENCY MEDICINE | Admitting: INTERNAL MEDICINE
Payer: COMMERCIAL

## 2025-01-01 DIAGNOSIS — R41.82 ALTERED MENTAL STATUS, UNSPECIFIED ALTERED MENTAL STATUS TYPE: Primary | ICD-10-CM

## 2025-01-01 DIAGNOSIS — I26.99 OTHER ACUTE PULMONARY EMBOLISM WITHOUT ACUTE COR PULMONALE (HCC): ICD-10-CM

## 2025-01-01 PROCEDURE — 93005 ELECTROCARDIOGRAM TRACING: CPT | Performed by: EMERGENCY MEDICINE

## 2025-01-01 PROCEDURE — 81001 URINALYSIS AUTO W/SCOPE: CPT

## 2025-01-01 PROCEDURE — 99285 EMERGENCY DEPT VISIT HI MDM: CPT

## 2025-01-01 RX ORDER — LORAZEPAM 2 MG/ML
1 INJECTION INTRAMUSCULAR ONCE
Status: COMPLETED | OUTPATIENT
Start: 2025-01-02 | End: 2025-01-02

## 2025-01-01 ASSESSMENT — PAIN - FUNCTIONAL ASSESSMENT: PAIN_FUNCTIONAL_ASSESSMENT: NONE - DENIES PAIN

## 2025-01-02 ENCOUNTER — APPOINTMENT (OUTPATIENT)
Dept: CT IMAGING | Age: 60
End: 2025-01-02
Payer: COMMERCIAL

## 2025-01-02 PROBLEM — R41.0 ACUTE DELIRIUM: Status: ACTIVE | Noted: 2025-01-02

## 2025-01-02 PROBLEM — R41.82 AMS (ALTERED MENTAL STATUS): Status: ACTIVE | Noted: 2025-01-02

## 2025-01-02 PROBLEM — F23 ACUTE PSYCHOSIS (HCC): Status: ACTIVE | Noted: 2025-01-02

## 2025-01-02 PROBLEM — F03.90 DEMENTIA (HCC): Status: ACTIVE | Noted: 2025-01-02

## 2025-01-02 LAB
ALBUMIN SERPL-MCNC: 3.7 G/DL (ref 3.5–5.2)
ALP SERPL-CCNC: 99 U/L (ref 40–129)
ALT SERPL-CCNC: 13 U/L (ref 10–50)
ANION GAP SERPL CALCULATED.3IONS-SCNC: 17 MMOL/L (ref 9–16)
AST SERPL-CCNC: 25 U/L (ref 10–50)
BACTERIA URNS QL MICRO: ABNORMAL
BASOPHILS # BLD: 0.1 K/UL (ref 0–0.2)
BASOPHILS NFR BLD: 1 % (ref 0–2)
BILIRUB SERPL-MCNC: 0.2 MG/DL (ref 0–1.2)
BILIRUB UR QL STRIP: NEGATIVE
BUN SERPL-MCNC: 24 MG/DL (ref 8–23)
CALCIUM SERPL-MCNC: 9.2 MG/DL (ref 8.6–10.4)
CASTS #/AREA URNS LPF: ABNORMAL /LPF
CHLORIDE SERPL-SCNC: 112 MMOL/L (ref 98–107)
CLARITY UR: CLEAR
CO2 SERPL-SCNC: 18 MMOL/L (ref 20–31)
COLOR UR: YELLOW
CREAT SERPL-MCNC: 1.4 MG/DL (ref 0.7–1.2)
EKG ATRIAL RATE: 123 BPM
EKG P AXIS: 74 DEGREES
EKG P-R INTERVAL: 116 MS
EKG Q-T INTERVAL: 330 MS
EKG QRS DURATION: 76 MS
EKG QTC CALCULATION (BAZETT): 472 MS
EKG R AXIS: 43 DEGREES
EKG T AXIS: 56 DEGREES
EKG VENTRICULAR RATE: 123 BPM
EOSINOPHIL # BLD: 0.1 K/UL (ref 0–0.4)
EOSINOPHILS RELATIVE PERCENT: 1 % (ref 0–4)
EPI CELLS #/AREA URNS HPF: ABNORMAL /HPF
ERYTHROCYTE [DISTWIDTH] IN BLOOD BY AUTOMATED COUNT: 13.8 % (ref 11.5–14.9)
GFR, ESTIMATED: 57 ML/MIN/1.73M2
GLUCOSE SERPL-MCNC: 136 MG/DL (ref 74–99)
GLUCOSE UR STRIP-MCNC: NEGATIVE MG/DL
HCT VFR BLD AUTO: 40.6 % (ref 41–53)
HGB BLD-MCNC: 13.6 G/DL (ref 13.5–17.5)
HGB UR QL STRIP.AUTO: NEGATIVE
KETONES UR STRIP-MCNC: ABNORMAL MG/DL
LEUKOCYTE ESTERASE UR QL STRIP: NEGATIVE
LIPASE SERPL-CCNC: 66 U/L (ref 13–60)
LYMPHOCYTES NFR BLD: 1.1 K/UL (ref 1–4.8)
LYMPHOCYTES RELATIVE PERCENT: 13 % (ref 24–44)
MAGNESIUM SERPL-MCNC: 2.2 MG/DL (ref 1.6–2.4)
MCH RBC QN AUTO: 29.6 PG (ref 26–34)
MCHC RBC AUTO-ENTMCNC: 33.5 G/DL (ref 31–37)
MCV RBC AUTO: 88.1 FL (ref 80–100)
MONOCYTES NFR BLD: 1 K/UL (ref 0.1–1.3)
MONOCYTES NFR BLD: 11 % (ref 1–7)
NEUTROPHILS NFR BLD: 74 % (ref 36–66)
NEUTS SEG NFR BLD: 6.7 K/UL (ref 1.3–9.1)
NITRITE UR QL STRIP: NEGATIVE
PH UR STRIP: 5.5 [PH] (ref 5–8)
PLATELET # BLD AUTO: 367 K/UL (ref 150–450)
PMV BLD AUTO: 7.7 FL (ref 6–12)
POTASSIUM SERPL-SCNC: 3.9 MMOL/L (ref 3.7–5.3)
PROT SERPL-MCNC: 7.3 G/DL (ref 6.6–8.7)
PROT UR STRIP-MCNC: ABNORMAL MG/DL
RBC # BLD AUTO: 4.61 M/UL (ref 4.5–5.9)
RBC #/AREA URNS HPF: ABNORMAL /HPF
SODIUM SERPL-SCNC: 147 MMOL/L (ref 136–145)
SP GR UR STRIP: 1.04 (ref 1–1.03)
T4 FREE SERPL-MCNC: 1.2 NG/DL (ref 0.9–1.7)
TSH SERPL DL<=0.05 MIU/L-ACNC: 5.68 UIU/ML (ref 0.27–4.2)
UROBILINOGEN UR STRIP-ACNC: NORMAL EU/DL (ref 0–1)
WBC #/AREA URNS HPF: ABNORMAL /HPF
WBC OTHER # BLD: 9 K/UL (ref 3.5–11)

## 2025-01-02 PROCEDURE — 84439 ASSAY OF FREE THYROXINE: CPT

## 2025-01-02 PROCEDURE — 84443 ASSAY THYROID STIM HORMONE: CPT

## 2025-01-02 PROCEDURE — 36415 COLL VENOUS BLD VENIPUNCTURE: CPT

## 2025-01-02 PROCEDURE — 6360000002 HC RX W HCPCS

## 2025-01-02 PROCEDURE — 99222 1ST HOSP IP/OBS MODERATE 55: CPT | Performed by: PSYCHIATRY & NEUROLOGY

## 2025-01-02 PROCEDURE — 2580000003 HC RX 258: Performed by: EMERGENCY MEDICINE

## 2025-01-02 PROCEDURE — 83690 ASSAY OF LIPASE: CPT

## 2025-01-02 PROCEDURE — 70450 CT HEAD/BRAIN W/O DYE: CPT

## 2025-01-02 PROCEDURE — APPSS60 APP SPLIT SHARED TIME 46-60 MINUTES

## 2025-01-02 PROCEDURE — 85025 COMPLETE CBC W/AUTO DIFF WBC: CPT

## 2025-01-02 PROCEDURE — 6360000002 HC RX W HCPCS: Performed by: EMERGENCY MEDICINE

## 2025-01-02 PROCEDURE — 80053 COMPREHEN METABOLIC PANEL: CPT

## 2025-01-02 PROCEDURE — 99223 1ST HOSP IP/OBS HIGH 75: CPT

## 2025-01-02 PROCEDURE — 93010 ELECTROCARDIOGRAM REPORT: CPT | Performed by: INTERNAL MEDICINE

## 2025-01-02 PROCEDURE — 1200000000 HC SEMI PRIVATE

## 2025-01-02 PROCEDURE — 83735 ASSAY OF MAGNESIUM: CPT

## 2025-01-02 RX ORDER — SODIUM CHLORIDE 0.9 % (FLUSH) 0.9 %
10 SYRINGE (ML) INJECTION PRN
Status: DISCONTINUED | OUTPATIENT
Start: 2025-01-02 | End: 2025-01-10 | Stop reason: HOSPADM

## 2025-01-02 RX ORDER — SODIUM CHLORIDE 0.9 % (FLUSH) 0.9 %
5-40 SYRINGE (ML) INJECTION EVERY 12 HOURS SCHEDULED
Status: DISCONTINUED | OUTPATIENT
Start: 2025-01-02 | End: 2025-01-10 | Stop reason: HOSPADM

## 2025-01-02 RX ORDER — POTASSIUM CHLORIDE 1500 MG/1
40 TABLET, EXTENDED RELEASE ORAL PRN
Status: DISCONTINUED | OUTPATIENT
Start: 2025-01-02 | End: 2025-01-10 | Stop reason: HOSPADM

## 2025-01-02 RX ORDER — AMOXICILLIN 250 MG
1 CAPSULE ORAL EVERY 12 HOURS PRN
COMMUNITY

## 2025-01-02 RX ORDER — ONDANSETRON 2 MG/ML
4 INJECTION INTRAMUSCULAR; INTRAVENOUS EVERY 6 HOURS PRN
Status: DISCONTINUED | OUTPATIENT
Start: 2025-01-02 | End: 2025-01-10 | Stop reason: HOSPADM

## 2025-01-02 RX ORDER — SODIUM CHLORIDE 9 MG/ML
INJECTION, SOLUTION INTRAVENOUS CONTINUOUS
Status: DISCONTINUED | OUTPATIENT
Start: 2025-01-02 | End: 2025-01-04

## 2025-01-02 RX ORDER — CLOZAPINE 25 MG/1
25 TABLET ORAL 2 TIMES DAILY
Status: DISCONTINUED | OUTPATIENT
Start: 2025-01-02 | End: 2025-01-07

## 2025-01-02 RX ORDER — ACETAMINOPHEN 650 MG/1
650 SUPPOSITORY RECTAL EVERY 6 HOURS PRN
Status: DISCONTINUED | OUTPATIENT
Start: 2025-01-02 | End: 2025-01-10 | Stop reason: HOSPADM

## 2025-01-02 RX ORDER — MAGNESIUM SULFATE HEPTAHYDRATE 40 MG/ML
2000 INJECTION, SOLUTION INTRAVENOUS PRN
Status: DISCONTINUED | OUTPATIENT
Start: 2025-01-02 | End: 2025-01-10 | Stop reason: HOSPADM

## 2025-01-02 RX ORDER — SENNA AND DOCUSATE SODIUM 50; 8.6 MG/1; MG/1
1 TABLET, FILM COATED ORAL EVERY 12 HOURS PRN
Status: DISCONTINUED | OUTPATIENT
Start: 2025-01-02 | End: 2025-01-10 | Stop reason: HOSPADM

## 2025-01-02 RX ORDER — BISACODYL 10 MG
10 SUPPOSITORY, RECTAL RECTAL DAILY PRN
Status: DISCONTINUED | OUTPATIENT
Start: 2025-01-02 | End: 2025-01-10 | Stop reason: HOSPADM

## 2025-01-02 RX ORDER — LORAZEPAM 0.5 MG/1
0.5 TABLET ORAL EVERY 6 HOURS
Status: DISCONTINUED | OUTPATIENT
Start: 2025-01-02 | End: 2025-01-02

## 2025-01-02 RX ORDER — ONDANSETRON 4 MG/1
4 TABLET, ORALLY DISINTEGRATING ORAL EVERY 8 HOURS PRN
Status: DISCONTINUED | OUTPATIENT
Start: 2025-01-02 | End: 2025-01-10 | Stop reason: HOSPADM

## 2025-01-02 RX ORDER — SODIUM CHLORIDE 9 MG/ML
INJECTION, SOLUTION INTRAVENOUS PRN
Status: DISCONTINUED | OUTPATIENT
Start: 2025-01-02 | End: 2025-01-10 | Stop reason: HOSPADM

## 2025-01-02 RX ORDER — POLYETHYLENE GLYCOL 3350 17 G/17G
17 POWDER, FOR SOLUTION ORAL DAILY PRN
Status: DISCONTINUED | OUTPATIENT
Start: 2025-01-02 | End: 2025-01-10 | Stop reason: HOSPADM

## 2025-01-02 RX ORDER — BISACODYL 10 MG
10 SUPPOSITORY, RECTAL RECTAL DAILY PRN
COMMUNITY

## 2025-01-02 RX ORDER — HALOPERIDOL 5 MG/ML
2 INJECTION INTRAMUSCULAR ONCE
Status: COMPLETED | OUTPATIENT
Start: 2025-01-02 | End: 2025-01-02

## 2025-01-02 RX ORDER — ACETAMINOPHEN 325 MG/1
650 TABLET ORAL EVERY 6 HOURS PRN
Status: DISCONTINUED | OUTPATIENT
Start: 2025-01-02 | End: 2025-01-10 | Stop reason: HOSPADM

## 2025-01-02 RX ORDER — POTASSIUM CHLORIDE 7.45 MG/ML
10 INJECTION INTRAVENOUS PRN
Status: DISCONTINUED | OUTPATIENT
Start: 2025-01-02 | End: 2025-01-10 | Stop reason: HOSPADM

## 2025-01-02 RX ORDER — LORAZEPAM 0.5 MG/1
0.5 TABLET ORAL EVERY 6 HOURS
Status: ON HOLD | COMMUNITY
End: 2025-01-10 | Stop reason: HOSPADM

## 2025-01-02 RX ORDER — LORAZEPAM 2 MG/ML
0.5 INJECTION INTRAMUSCULAR EVERY 6 HOURS
Status: DISCONTINUED | OUTPATIENT
Start: 2025-01-02 | End: 2025-01-02

## 2025-01-02 RX ORDER — ENOXAPARIN SODIUM 100 MG/ML
40 INJECTION SUBCUTANEOUS DAILY
Status: DISCONTINUED | OUTPATIENT
Start: 2025-01-02 | End: 2025-01-09

## 2025-01-02 RX ORDER — LORAZEPAM 2 MG/ML
1 INJECTION INTRAMUSCULAR EVERY 6 HOURS
Status: DISCONTINUED | OUTPATIENT
Start: 2025-01-03 | End: 2025-01-04

## 2025-01-02 RX ADMIN — SODIUM CHLORIDE: 9 INJECTION, SOLUTION INTRAVENOUS at 02:07

## 2025-01-02 RX ADMIN — LORAZEPAM 1 MG: 2 INJECTION INTRAMUSCULAR; INTRAVENOUS at 02:06

## 2025-01-02 RX ADMIN — LORAZEPAM 0.5 MG: 2 INJECTION INTRAMUSCULAR; INTRAVENOUS at 18:22

## 2025-01-02 RX ADMIN — HALOPERIDOL LACTATE 2 MG: 5 INJECTION, SOLUTION INTRAMUSCULAR at 03:09

## 2025-01-02 NOTE — PROGRESS NOTES
[facility] Internal Medicine  Mick Canchola MD; Buddy Corea MD; Lito Lopez MD; MD Chelita Archer MD; Santino Waldrop MD  [facility] Internal Medicine   IN-PATIENT SERVICE  [facility]                 Date:   1/2/2025  Patientname:  Héctor Tomas  Date of admission:  1/1/2025 11:21 PM  MRN:   [id number]  Account:  [id number]  YOB: 1965  PCP:    Luciano Acosta MD  Room:   2037/2037-01  Code Status:    DNR-CC      Chief Complaint:     Chief Complaint   Patient presents with    Aggressive Behavior       History of Present Illness:     Héctor Tomas is a 59 y.o. Non- / non  male who presents with Aggressive Behavior   And reports that patient is not acting himself.  He is admitted to the hospital for the management of AMS (altered mental status).    Patient's medical history significant for schizophrenia, mentally disabled, failure to thrive in adult, and hypertension.    At time of exam, patient is unable to provide any input into HPI.  When asked where we are, patient responded back asking, \"where are we?\"  Patient states that it is December but does not answer any further questions.    Telephone call placed to patient's ECF.  According to the nurse caring for the patient, patient has become extremely aggressive with staff over the past week.  Nurse reports that this is a new behavior for the patient, as he has never been aggressive with staff in the past.  Nurse reported that patient had been on hospice care for malignant neoplasm of the lung and that these services were recently discontinued in December; however, she is unsure as to why he is no longer receiving hospice care.  Nurse reported that prior to being discontinued from hospice, patient was taking morphine sulfate 10 mg every hour as needed.  States that patient was getting this medication every 3-4 hours around-the-clock.  Unfortunately, when his hospice services were  Range    Ventricular Rate 120 BPM    Atrial Rate 120 BPM    P-R Interval 130 ms    QRS Duration 78 ms    Q-T Interval 324 ms    QTc Calculation (Bazett) 457 ms    P Axis 76 degrees    R Axis 60 degrees    T Axis 22 degrees   CBC with Auto Differential    Collection Time: 01/02/25 12:03 AM   Result Value Ref Range    WBC 9.0 3.5 - 11.0 k/uL    RBC 4.61 4.5 - 5.9 m/uL    Hemoglobin 13.6 13.5 - 17.5 g/dL    Hematocrit 40.6 (L) 41 - 53 %    MCV 88.1 80 - 100 fL    MCH 29.6 26 - 34 pg    MCHC 33.5 31 - 37 g/dL    RDW 13.8 11.5 - 14.9 %    Platelets 367 150 - 450 k/uL    MPV 7.7 6.0 - 12.0 fL    Neutrophils % 74 (H) 36 - 66 %    Lymphocytes % 13 (L) 24 - 44 %    Monocytes % 11 (H) 1 - 7 %    Eosinophils % 1 0 - 4 %    Basophils % 1 0 - 2 %    Neutrophils Absolute 6.70 1.3 - 9.1 k/uL    Lymphocytes Absolute 1.10 1.0 - 4.8 k/uL    Monocytes Absolute 1.00 0.1 - 1.3 k/uL    Eosinophils Absolute 0.10 0.0 - 0.4 k/uL    Basophils Absolute 0.10 0.0 - 0.2 k/uL   CMP    Collection Time: 01/02/25 12:03 AM   Result Value Ref Range    Sodium 147 (H) 136 - 145 mmol/L    Potassium 3.9 3.7 - 5.3 mmol/L    Chloride 112 (H) 98 - 107 mmol/L    CO2 18 (L) 20 - 31 mmol/L    Anion Gap 17 (H) 9 - 16 mmol/L    Glucose 136 (H) 74 - 99 mg/dL    BUN 24 (H) 8 - 23 mg/dL    Creatinine 1.4 (H) 0.7 - 1.2 mg/dL    Est, Glom Filt Rate 57 (L) >60 mL/min/1.73m2    Calcium 9.2 8.6 - 10.4 mg/dL    Total Protein 7.3 6.6 - 8.7 g/dL    Albumin 3.7 3.5 - 5.2 g/dL    Total Bilirubin 0.2 0.0 - 1.2 mg/dL    Alkaline Phosphatase 99 40 - 129 U/L    ALT 13 10 - 50 U/L    AST 25 10 - 50 U/L   T4, Free    Collection Time: 01/02/25 12:03 AM   Result Value Ref Range    T4 Free 1.2 0.9 - 1.7 ng/dL   Lipase    Collection Time: 01/02/25 12:03 AM   Result Value Ref Range    Lipase 66 (H) 13 - 60 U/L   Magnesium    Collection Time: 01/02/25 12:03 AM   Result Value Ref Range    Magnesium 2.2 1.6 - 2.4 mg/dL   TSH    Collection Time: 01/02/25 12:03 AM   Result Value Ref Range

## 2025-01-02 NOTE — ED NOTES
Patient is aggressive towards hospital staff verbally and physically. Patient is unwilling to remain on vital sign monitoring equipment.

## 2025-01-02 NOTE — CARE COORDINATION
Case Management Assessment  Initial Evaluation    Date/Time of Evaluation: 1/2/2025 4:41 PM  Assessment Completed by: Juliet Leija RN    If patient is discharged prior to next notation, then this note serves as note for discharge by case management.    Patient Name: Héctor Tomas                   YOB: 1965  Diagnosis: Altered mental status, unspecified altered mental status type [R41.82]  AMS (altered mental status) [R41.82]                   Date / Time: 1/1/2025 11:21 PM    Patient Admission Status: Inpatient   Readmission Risk (Low < 19, Mod (19-27), High > 27): Readmission Risk Score: 11.2    Current PCP: Luciano Acosta MD  PCP verified by CM? Yes    Chart Reviewed: Yes      History Provided by: Medical Record  Patient Orientation: Other (see comment) (Dementia)    Patient Cognition: Dementia / Early Alzheimer's    Hospitalization in the last 30 days (Readmission):  No    If yes, Readmission Assessment in  Navigator will be completed.    Advance Directives:      Code Status: DNR-CC   Patient's Primary Decision Maker is:        Discharge Planning:    Patient lives with: Other (Comment) (From LTC) Type of Home: Long-Term Care  Primary Care Giver: Other (Comment) (From LT)  Patient Support Systems include: Other (Comment) (Has Court appointed guardian)   Current Financial resources: Medicare, Medicaid  Current community resources: ECF/Home Care  Current services prior to admission: Extended Care Facility            Current DME:              Type of Home Care services:  None    ADLS  Prior functional level: Assistance with the following:, Mobility, Dressing, Bathing, Toileting, Feeding, Cooking, Housework, Shopping  Current functional level: Assistance with the following:, Mobility, Bathing, Dressing, Toileting, Feeding, Cooking, Housework, Shopping    PT AM-PAC:   /24  OT AM-PAC:   /24    Family can provide assistance at DC: No  Would you like Case Management to discuss the discharge

## 2025-01-02 NOTE — ED PROVIDER NOTES
EMERGENCY DEPARTMENT ENCOUNTER    Pt Name: Héctor Tomas  MRN: [id number]  Birthdate 1965  Date of evaluation: 1/1/25  CHIEF COMPLAINT       Chief Complaint   Patient presents with    Aggressive Behavior     HISTORY OF PRESENT ILLNESS   59-year-old male presents for reported aggressive behavior and altered mental status.  Patient has a history of dementia, was sent in by nursing home as he has been on reportedly trying to hit staff recently and not acting himself.  Further history limited as patient altered    The history is provided by the nursing home and the EMS personnel. The history is limited by the condition of the patient.           REVIEW OF SYSTEMS     Review of Systems   Unable to perform ROS: Mental status change     PASTMEDICAL HISTORY     Past Medical History:   Diagnosis Date    Adult failure to thrive     Agitation     Bradycardia     Dementia (HCC)     Falls     Gastritis     GERD (gastroesophageal reflux disease)     GI hemorrhage     Hyperlipidemia     Hypertension     Intellectual disability     Lack of coordination     Pneumonia     Restlessness     Schizophrenia with prominent negative symptoms (HCC)     Unsteady      Past Problem List  Patient Active Problem List   Diagnosis Code    Constipation K59.00    Esophagitis K20.90    GI bleed K92.2    Mentally disabled F79    Other schizophrenia (HCC) F20.89    Retention of urine R33.9    FTT (failure to thrive) in adult R62.7    Frequent falls R29.6    AMS (altered mental status) R41.82     SURGICAL HISTORY       Past Surgical History:   Procedure Laterality Date    COLONOSCOPY N/A 7/19/2021    COLORECTAL CANCER SCREENING TO HEPATIC FLEXURE performed by David Alcazar MD at [facility] OR    ENDOSCOPY, COLON, DIAGNOSTIC  07/19/2021    EGD BIOPSY    UPPER GASTROINTESTINAL ENDOSCOPY N/A 7/19/2021    EGD BIOPSY SMALL BOWEL AND STOMACH AND GE JUNCTION performed by David Alcazar MD at [facility] OR     CURRENT MEDICATIONS       Previous

## 2025-01-02 NOTE — PROGRESS NOTES
[facility]   OCCUPATIONAL THERAPY MISSED TREATMENT NOTE   INPATIENT   Date: 25  Patient Name: Héctor Tomas       Room:   MRN: [id number]   Account #: [id number]    : 1965  (59 y.o.)  Gender: male                 REASON FOR MISSED TREATMENT:  848: Hold OT eval per JEAN PIERRE Arias pt agitated this AM. Will continue to follow.      Electronically signed by MARGAUX Garcia on 25 at 8:52 AM EST

## 2025-01-02 NOTE — ED NOTES
This writer was asked to lay eyes on this patient due to state of potential neglect he was in upon arrival.  Prior to entering the patients room, a strong fecal odor was noticeable.  Once in the patient room, the odor became stronger.  This writer then approached the patient at bedside and the sheet was removed from his person, revealing the patient had in fact defecated some time prior to arrival at this ED.  Patient was wearing a brief that was saturated with urine, and feces was leaking out of the sides and underneath.  There was dried feces on the inner part of his thighs.  The sheets that the patient was laying on were tinted yellow from urine soaking in them and appeared as though they had not been changed in several days.  The patient appeared to be very malnourished and lacking any muscle mass in his arms and legs.  The patient was unable to speak at this time, therefore no full assessment could be completed.  The patient's hair was extremely greasy and appeared as though it had not been washed in several weeks.    This writer did phone the facility [facility] to inquire the events that lead to the patients transfer to this facility.  Per nursing staff, this patient has been refusing all care by nursing staff and aides.  It was also reported that this patient does have a guardian, and has been on hospice care for the entire duration of his stay with their facility.  Patient has been with their facility for over 2 years.  Nursing staff stated that approximately 2 weeks ago, someone made the determination to take him off of hospice care.  While on hospice care, the patient was receiving Morphine for pain management and when they ended hospice care, so did the pain management/morphine.  Per the nursing staff, this is consistent with the increase of aggressive behaviors and agitation.  Patient is typically reported to be verbal and is able to utilize the restroom independently during the

## 2025-01-02 NOTE — H&P
[facility] Internal Medicine  Mick Canchola MD; Buddy Corea MD; Lito Lopez MD; MD Chelita Archer MD; Santino Waldrop MD; Anna Aquino MD; Anai Nunez MD    [facility] Internal Medicine   IN-PATIENT SERVICE   [facility]    HISTORY AND PHYSICAL EXAMINATION            Date:   1/2/2025  Patient name:  Héctor Tomas  Date of admission:  1/1/2025 11:21 PM  MRN:   [id number]  Account:  [id number]  YOB: 1965  PCP:    Luciano Acosta MD  Room:   2037/2037-01  Code Status:    DNR-CC    Chief Complaint:     Chief Complaint   Patient presents with   • Aggressive Behavior        History Obtained From:     patient, electronic medical record    History of Present Illness:     Héctor Tomas is a 59 y.o. Non- / non  male who presents with Aggressive Behavior   and is admitted to the hospital for the management of AMS (altered mental status).  Héctor Tomas is a 59 y.o. Non- / non  male who presents with Aggressive Behavior   And reports that patient is not acting himself.  He is admitted to the hospital for the management of AMS (altered mental status).     Patient's medical history significant for schizophrenia, mentally disabled, failure to thrive in adult, and hypertension.     At time of exam, patient is unable to provide any input into HPI.  When asked where we are, patient responded back asking, \"where are we?\"  Patient states that it is December but does not answer any further questions.     Telephone call placed to patient's ECF.  According to the nurse caring for the patient, patient has become extremely aggressive with staff over the past week.  Nurse reports that this is a new behavior for the patient, as he has never been aggressive with staff in the past.  Nurse reported that patient had been on hospice care for malignant neoplasm of the lung and that these services were recently  metabolic encephalopathy.  Acute metabolic encephalopathy, unknown cause.  Unknown baseline.  Pretty agitated.  Psych and neurology consult.  Agitated, currently under restraints.  TAMARA.  Continue fluids, urinalysis not concerning for UTI.  Check CK.  History of dementia.  History of schizophrenia.  DVT prophylaxis      Consultations:   IP CONSULT TO PSYCHIATRY  IP CONSULT TO SOCIAL WORK  IP CONSULT TO NEUROLOGY      Patient is admitted as inpatient status because of co-morbidities listed above, severity of signs and symptoms as outlined, requirement for current medical therapies and most importantly because of direct risk to patient if care not provided in a hospital setting.  Expected length of stay > 48 hours.    Anai Nunez MD  1/2/2025  6:28 AM    Copy sent to Lucinao Campbell MD    Please note that this chart was generated using voice recognition Dragon dictation software.  Although every effort was made to ensure the accuracy of this automated transcription, some errors in transcription may have occurred.

## 2025-01-02 NOTE — CONSULTS
[facility] Neurology   IN-PATIENT SERVICE      NEUROLOGY CONSULT  NOTE            Date:   1/2/2025  Patient name:  Héctor Tomas  Date of admission:  1/1/2025  YOB: 1965      Chief Complaint:     Chief Complaint   Patient presents with    Aggressive Behavior       Reason for Consult:      [facility]    History of Present Illness:     59-year-old male with past medical history of schizophrenia, intellectual disability, dementia, hypertension, lung cancer-not currently on therapy, most recently on hospice, who presented with altered mentation.  Neurology consulted for further evaluation.  History extremely limited given patient's mentation.  Obtained primarily from chart review.  Attempted to call his legal guardian, no response.    Patient is a resident of [facility].  Per chart review, appears that he has been aggressive with staff over the last week.  He has been refusing care and medications.  Noted with poor hygiene in the ED.  Appears that he was most recently under hospice due to lung cancer.  Services were discontinued in December.  He was previously taking high doses of morphine every 3-4 hours.  Then, after discontinuation of hospice services about 1 week ago, has not been getting any pain medications.  During this time, his behavior has worsened and he has been aggressive with the staff.    At time of evaluation, he is seen asleep in bed.  He has restraints on.  Per nursing staff, has not been significantly agitated thus far today.    Past Medical History:     Past Medical History:   Diagnosis Date    Adult failure to thrive     Agitation     Bradycardia     Dementia (HCC)     Falls     Gastritis     GERD (gastroesophageal reflux disease)     GI hemorrhage     Hyperlipidemia     Hypertension     Intellectual disability     Lack of coordination     Pneumonia     Restlessness     Schizophrenia with prominent negative symptoms (HCC)     Unsteady         Past Surgical History:     Past Surgical History:  intracranial hemorrhage, mass effect or  midline shift.  No abnormal extra-axial fluid collection.  The gray-white  differentiation is maintained without evidence of an acute infarct.  There is  no evidence of hydrocephalus.    ORBITS: The visualized portion of the orbits demonstrate no acute abnormality.    SINUSES: The visualized paranasal sinuses and mastoid air cells demonstrate  no acute abnormality.    SOFT TISSUES/SKULL:  No acute abnormality of the visualized skull or soft  tissues.    Impression  No acute intracranial abnormality identified.        I personally reviewed all of the above medications, clinical laboratory, imaging and other diagnostic tests.         Impression:      Acute encephalopathy, agitation, in a patient with history of intellectual disability, dementia, schizophrenia.  May have component of opioid withdrawals given abrupt discontinuation of morphine recently.  CT brain with no acute findings.    Plan:     Difficult social situation given limited history and medical records. He was reportedly recently in hospice due to lung cancer.  He is DNR CC.  SW attempting to coordinate his care and obtain more information.  I also attempted to reach out to legal guardian, no response.  Will await further information and direction in regards to his care as we do not know his baseline and current goals of care. Psychiatry consulted as well. F/u recs. He is currently refusing all medications and care including labs.    We will continue to follow.    Electronically signed by Carlota Trujillo DO on 1/2/2025 at 10:44 AM      Carlota Trujillo DO  [facility] [place]  Neurology

## 2025-01-02 NOTE — CONSULTS
closed in bilateral wrist restraints. He opens his eyes briefly upon verbal stimuli alone then returns to sleep.  He is unable to engage in meaningful discussion and interview was terminated at this time.  Sitter at bedside reports patient has been somnolent this morning. Patient did receive IV Ativan 1 mg today at 0206 and IM Haldol 2 mg at 0309.  He is also receiving p.o. Ativan 0.5 mg every 6 hours. Staff nurse reports patient has been aggressive and resistant with care. He has not consumed much oral intake. He is receiving IVF. Patient unable to provide details of psychiatric history. Call placed to Rudolph Laura, legal guardian, who unable to be reached. Patient is reported to be a DNR-CC. PDMP is 300.  Prescription for Ativan 0.5 mg quantity 60 x 50 days filled on 12/21/2024.    The patient is not currently receiving care for the above psychiatric illness. Will defer to attending psychiatrist.     Past Psychiatric History:  Prior Diagnosis: Schizophrenia, dementia  Outpatient psychiatric provider: None  Psychiatric Hospitalization: Unknown  Hx of Suicidal Attempts: Unknown  Hx of violence:  yes    Past psychiatric medications includes:   Cogentin, Clozaril    Adverse reactions from psychotropic medications:  Unknown    Substance Abuse History:  ETOH: Unknown, no UDS on file  Marijuana: Unknown, no UDS on file  Opiates: Unknown, no UDS on file  Other Drugs: Unknown, no UDS on file    Social History:     RESIDENCE: [facility]  : Unknown    CHILDREN: Unknown  OCCUPATION: Unemployed  EDUCATION: Unknown    Past Medical History:        Diagnosis Date    Adult failure to thrive     Agitation     Bradycardia     Dementia (HCC)     Falls     Gastritis     GERD (gastroesophageal reflux disease)     GI hemorrhage     Hyperlipidemia     Hypertension     Intellectual disability     Lack of coordination     Pneumonia     Restlessness     Schizophrenia with prominent negative symptoms (HCC)      Place 1 tablet under the tongue every 4 hours as needed for Secretions    Provider, MD Devang   bisacodyl (DULCOLAX) 10 MG suppository Place 1 suppository rectally daily as needed for Constipation    ProviderDevang MD   senna-docusate (PERICOLACE) 8.6-50 MG per tablet Take 1 tablet by mouth every 12 hours as needed for Constipation    Provider, MD Devang        Medications:    Current Facility-Administered Medications: 0.9 % sodium chloride infusion, , IntraVENous, Continuous  sodium chloride flush 0.9 % injection 5-40 mL, 5-40 mL, IntraVENous, 2 times per day  sodium chloride flush 0.9 % injection 10 mL, 10 mL, IntraVENous, PRN  0.9 % sodium chloride infusion, , IntraVENous, PRN  potassium chloride (KLOR-CON M) extended release tablet 40 mEq, 40 mEq, Oral, PRN **OR** potassium bicarb-citric acid (EFFER-K) effervescent tablet 40 mEq, 40 mEq, Oral, PRN **OR** potassium chloride 10 mEq/100 mL IVPB (Peripheral Line), 10 mEq, IntraVENous, PRN  magnesium sulfate 2000 mg in water 50 mL IVPB, 2,000 mg, IntraVENous, PRN  enoxaparin (LOVENOX) injection 40 mg, 40 mg, SubCUTAneous, Daily  ondansetron (ZOFRAN-ODT) disintegrating tablet 4 mg, 4 mg, Oral, Q8H PRN **OR** ondansetron (ZOFRAN) injection 4 mg, 4 mg, IntraVENous, Q6H PRN  melatonin tablet 3 mg, 3 mg, Oral, Nightly PRN  polyethylene glycol (GLYCOLAX) packet 17 g, 17 g, Oral, Daily PRN  bisacodyl (DULCOLAX) suppository 10 mg, 10 mg, Rectal, Daily PRN  acetaminophen (TYLENOL) tablet 650 mg, 650 mg, Oral, Q6H PRN **OR** acetaminophen (TYLENOL) suppository 650 mg, 650 mg, Rectal, Q6H PRN  hyoscyamine (LEVSIN/SL) sublingual tablet 0.125 mg, 0.125 mg, SubLINGual, Q4H PRN  LORazepam (ATIVAN) tablet 0.5 mg, 0.5 mg, Oral, Q6H  sennosides-docusate sodium (SENOKOT-S) 8.6-50 MG tablet 1 tablet, 1 tablet, Oral, Q12H PRN     Physical:    Vitals:  /73   Pulse 84   Temp 98.6 °F (37 °C) (Oral)   Resp 23   Wt 67.7 kg (149 lb 4 oz)   SpO2 94%   BMI 23.87

## 2025-01-02 NOTE — ED NOTES
Report given to JEAN PIERRE Huffman from [employer].   Report method by phone   The following was reviewed with receiving RN:   Current vital signs:  BP (!) 153/74   Pulse 97   Temp 98.6 °F (37 °C) (Oral)   Resp 22   SpO2 95%                      Any medication or safety alerts were reviewed. Any pending diagnostics and notifications were also reviewed, as well as any safety concerns or issues, abnormal labs, abnormal imaging, and abnormal assessment findings. Questions were answered.

## 2025-01-02 NOTE — CARE COORDINATION
DISCHARGE PLANNING NOTE:    Patient has legal guardian Rudolph Laura. Called and left message for him to call this writer back.     Electronically signed by Juliet Leija RN on 1/2/2025 at 11:22 AM    Attempted to call patient guardian for 2nd time to discuss plan at time of discharge. No answer, message left.    Electronically signed by Juliet Leija RN on 1/2/2025 at 1:41 PM

## 2025-01-02 NOTE — PLAN OF CARE
Problem: Safety - Medical Restraint  Goal: Remains free of injury from restraints (Restraint for Interference with Medical Device)  Description: INTERVENTIONS:  1. Determine that other, less restrictive measures have been tried or would not be effective before applying the restraint  2. Evaluate the patient's condition at the time of restraint application  3. Inform patient/family regarding the reason for restraint  4. Q2H: Monitor safety, psychosocial status, comfort, nutrition and hydration  Outcome: Progressing  Flowsheets  Taken 1/2/2025 0456  Remains free of injury from restraints (restraint for interference with medical device): Every 2 hours: Monitor safety, psychosocial status, comfort, nutrition and hydration  Taken 1/2/2025 0420  Remains free of injury from restraints (restraint for interference with medical device):   Every 2 hours: Monitor safety, psychosocial status, comfort, nutrition and hydration   Inform patient/family regarding the reason for restraint

## 2025-01-03 PROCEDURE — 6360000002 HC RX W HCPCS

## 2025-01-03 PROCEDURE — 99232 SBSQ HOSP IP/OBS MODERATE 35: CPT | Performed by: PSYCHIATRY & NEUROLOGY

## 2025-01-03 PROCEDURE — 2580000003 HC RX 258: Performed by: EMERGENCY MEDICINE

## 2025-01-03 PROCEDURE — 2500000003 HC RX 250 WO HCPCS

## 2025-01-03 PROCEDURE — 6360000002 HC RX W HCPCS: Performed by: PSYCHIATRY & NEUROLOGY

## 2025-01-03 PROCEDURE — 2500000003 HC RX 250 WO HCPCS: Performed by: NURSE PRACTITIONER

## 2025-01-03 PROCEDURE — 99232 SBSQ HOSP IP/OBS MODERATE 35: CPT

## 2025-01-03 PROCEDURE — 1200000000 HC SEMI PRIVATE

## 2025-01-03 PROCEDURE — 6370000000 HC RX 637 (ALT 250 FOR IP): Performed by: PSYCHIATRY & NEUROLOGY

## 2025-01-03 RX ADMIN — LORAZEPAM 1 MG: 2 INJECTION INTRAMUSCULAR; INTRAVENOUS at 12:24

## 2025-01-03 RX ADMIN — WATER 5 MG: 1 INJECTION INTRAMUSCULAR; INTRAVENOUS; SUBCUTANEOUS at 15:43

## 2025-01-03 RX ADMIN — SODIUM CHLORIDE: 9 INJECTION, SOLUTION INTRAVENOUS at 22:15

## 2025-01-03 RX ADMIN — SODIUM CHLORIDE, PRESERVATIVE FREE 10 ML: 5 INJECTION INTRAVENOUS at 19:35

## 2025-01-03 RX ADMIN — LORAZEPAM 1 MG: 2 INJECTION INTRAMUSCULAR; INTRAVENOUS at 00:52

## 2025-01-03 RX ADMIN — CLOZAPINE 25 MG: 25 TABLET ORAL at 19:34

## 2025-01-03 RX ADMIN — SODIUM CHLORIDE, PRESERVATIVE FREE 10 ML: 5 INJECTION INTRAVENOUS at 00:52

## 2025-01-03 RX ADMIN — RISPERIDONE 125 MG: 125 INJECTION, SUSPENSION, EXTENDED RELEASE SUBCUTANEOUS at 19:34

## 2025-01-03 RX ADMIN — LORAZEPAM 1 MG: 2 INJECTION INTRAMUSCULAR; INTRAVENOUS at 18:30

## 2025-01-03 NOTE — DISCHARGE INSTR - COC
Continuity of Care Form    Patient Name: Héctor Tomas   :  1965  MRN:  [id number]    Admit date:  2025  Discharge date:  1/10/2025    Code Status Order: DNR-CC   Advance Directives:   Advance Care Flowsheet Documentation             Admitting Physician:  Anna Aquino MD  PCP: Luciano Acosta MD    Discharging Nurse: Albina Cardoso RN  Discharging Hospital Unit/Room#: 7/-01  Discharging Unit Phone Number: [phone]    Emergency Contact:   Extended Emergency Contact Information  Primary Emergency Contact: Rudolph Laura  Home Phone: [phone]  Relation: Other   needed? No  Secondary Emergency Contact: Compa Cai  Home Phone: [phone]  Relation: Other   needed? No    Past Surgical History:  Past Surgical History:   Procedure Laterality Date    COLONOSCOPY N/A 2021    COLORECTAL CANCER SCREENING TO HEPATIC FLEXURE performed by David Alcazar MD at [facility] OR    ENDOSCOPY, COLON, DIAGNOSTIC  2021    EGD BIOPSY    UPPER GASTROINTESTINAL ENDOSCOPY N/A 2021    EGD BIOPSY SMALL BOWEL AND STOMACH AND GE JUNCTION performed by David Alcazar MD at [facility] OR       Immunization History:   Immunization History   Administered Date(s) Administered    COVID-19, J&J, (age 18y+), IM, 0.5 mL 2021    Pneumococcal, PPSV23, PNEUMOVAX 23, (age 2y+), SC/IM, 0.5mL 2016       Active Problems:  Patient Active Problem List   Diagnosis Code    Constipation K59.00    Esophagitis K20.90    GI bleed K92.2    Intellectual disability F79    Other schizophrenia (HCC) F20.89    Retention of urine R33.9    FTT (failure to thrive) in adult R62.7    Frequent falls R29.6    AMS (altered mental status) R41.82    Acute delirium R41.0    Dementia (HCC) F03.90    Acute psychosis (HCC) F23       Isolation/Infection:   Isolation            No Isolation          Patient Infection Status       None to display            Nurse Assessment:  Last Vital Signs: /73

## 2025-01-03 NOTE — PLAN OF CARE
Problem: Discharge Planning  Goal: Discharge to home or other facility with appropriate resources  Outcome: Progressing  Note: Team working together to meet discharge needs      Problem: Skin/Tissue Integrity  Goal: Absence of new skin breakdown  Description: 1.  Monitor for areas of redness and/or skin breakdown  2.  Assess vascular access sites hourly  3.  Every 4-6 hours minimum:  Change oxygen saturation probe site  4.  Every 4-6 hours:  If on nasal continuous positive airway pressure, respiratory therapy assess nares and determine need for appliance change or resting period.  Outcome: Progressing  Note: Skin integrity maintained     Problem: Safety - Adult  Goal: Free from fall injury  Outcome: Progressing  Flowsheets (Taken 1/3/2025 1994)  Free From Fall Injury: Instruct family/caregiver on patient safety  Note: Standard safety precautions maintained

## 2025-01-03 NOTE — PROGRESS NOTES
[facility] Internal Medicine  Mick Canchola MD; Buddy Corea MD; Lito Lopez MD; MD Chelita Archer MD; Santino Waldrop MD; Anna Aquino MD; Anai Nunez MD    [facility] Internal Medicine   IN-PATIENT SERVICE   [facility]    Progress Note            Date:   1/3/2025  Patient name:  Héctor Tomas  Date of admission:  1/1/2025 11:21 PM  MRN:   [id number]  Account:  [id number]  YOB: 1965  PCP:    Luciano Acosta MD  Room:   2037/2037-01  Code Status:    DNR-CC    Chief Complaint:     Chief Complaint   Patient presents with    Aggressive Behavior        History Obtained From:     patient, electronic medical record    History of Present Illness:     Héctor Tomas is a 59 y.o. Non- / non  male who presents with Aggressive Behavior   and is admitted to the hospital for the management of AMS (altered mental status).  Héctor Tomas is a 59 y.o. Non- / non  male who presents with Aggressive Behavior   And reports that patient is not acting himself.  He is admitted to the hospital for the management of AMS (altered mental status).     Patient's medical history significant for schizophrenia, mentally disabled, failure to thrive in adult, and hypertension.     At time of exam, patient is unable to provide any input into HPI.  When asked where we are, patient responded back asking, \"where are we?\"  Patient states that it is December but does not answer any further questions.     Telephone call placed to patient's ECF.  According to the nurse caring for the patient, patient has become extremely aggressive with staff over the past week.  Nurse reports that this is a new behavior for the patient, as he has never been aggressive with staff in the past.  Nurse reported that patient had been on hospice care for malignant neoplasm of the lung and that these services were recently discontinued in December;  reports that he does not currently use alcohol.  Drug Use:  has no history on file for drug use.    Family History:     Family History   Family history unknown: Yes       Review of Systems:     Positive and Negative as described in HPI.    Review of Systems   Unable to perform ROS: Mental status change   Patient is agitated in restraints.    Physical Exam:   /73   Pulse 84   Temp 98.6 °F (37 °C) (Oral)   Resp 23   Wt 67.7 kg (149 lb 4 oz)   SpO2 94%   BMI 23.87 kg/m²   No data recorded.    No results for input(s): \"POCGLU\" in the last 72 hours.  No intake or output data in the 24 hours ending 01/03/25 0849      Physical Exam  Vitals reviewed.   Constitutional:       General: He is not in acute distress.     Appearance: He is not ill-appearing.   HENT:      Head: Normocephalic.   Eyes:      General: Lids are normal.   Cardiovascular:      Rate and Rhythm: Normal rate.   Pulmonary:      Effort: No accessory muscle usage.   Musculoskeletal:      Right lower leg: No swelling.      Left lower leg: No swelling.   Neurological:      Mental Status: He is alert. He is disoriented and confused.   Psychiatric:         Behavior: Behavior is uncooperative and agitated.         Investigations:      Laboratory Testing:  No results found for this or any previous visit (from the past 24 hour(s)).      Imaging/Diagnostics:  CT Head W/O Contrast    Result Date: 1/2/2025  No acute intracranial abnormality identified.       Assessment :      Hospital Problems             Last Modified POA    * (Principal) AMS (altered mental status) 1/2/2025 Yes    Intellectual disability 1/2/2025 Yes    Acute delirium 1/2/2025 Yes    Dementia (HCC) 1/2/2025 Yes    Acute psychosis (HCC) 1/2/2025 Yes       Plan:     Patient status inpatient in the Med/Surge    59-year-old male with unknown baseline presented from [facility] with agitation.  Admitted for the management of acute metabolic encephalopathy.  Acute metabolic encephalopathy, unknown cause.

## 2025-01-03 NOTE — PROGRESS NOTES
BEHAVIORAL HEALTH FOLLOW-UP NOTE     1/3/2025     Patient was seen and examined in person, Chart reviewed   Patient's case discussed with staff/team    Chief Complaint: Acute Delirium secondary to acute metabolic encephalopathy  Dementia with behavioral disturbances   R/O acute opioid withdrawal    Interim History:     Patient was seen and examined at bedside.  Patient was in physical restraint.  Patient was quite aggressive and according to linda has been physically abusive towards staff such as kicking and has not been allowing them to help change his soiled sheets.  Patient refused to answer any of writer's questions.  Patient continuously started screaming get out get out get out all you.  When asked if patient knows where he has he refused to answer.  Patient was able to state that the month is January in the year 2025.  Patient refused to answer all other questions.    According to linda patient has had a meal and is however refusing all medications orally.  Writer spoke to patient's guardian Alberto Gustafsonsonal and received consent from Alberto to switch from oral clozapine to long-acting antipsychotic injectable.  Patient has previously been on clozapine per guardian's record.  On chart review patient has previously been on paliperidone.    /73   Pulse 84   Temp 98.6 °F (37 °C) (Oral)   Resp 23   Wt 67.7 kg (149 lb 4 oz)   SpO2 94%   BMI 23.87 kg/m²   Appetite:  [] Normal/Unchanged  [] Increased  [x] Decreased      Sleep:       [] Normal/Unchanged  [] Fair       [x] Poor              Energy:    [] Normal/Unchanged  [] Increased  [] Decreased        Aggression:  [] yes  [x] no    Patient is [] able  [x] unable to CONTRACT FOR SAFETY ON THE UNIT    PAST MEDICAL/PSYCHIATRIC HISTORY:   Past Medical History:   Diagnosis Date    Adult failure to thrive     Agitation     Bradycardia     Dementia (HCC)     Falls     Gastritis     GERD (gastroesophageal reflux disease)     GI hemorrhage     Hyperlipidemia  polyethylene glycol (GLYCOLAX) packet 17 g, 17 g, Oral, Daily PRN, Sue Eubanks APRN - CNP    bisacodyl (DULCOLAX) suppository 10 mg, 10 mg, Rectal, Daily PRN, Sue Eubanks APRN - CNP    acetaminophen (TYLENOL) tablet 650 mg, 650 mg, Oral, Q6H PRN **OR** acetaminophen (TYLENOL) suppository 650 mg, 650 mg, Rectal, Q6H PRN, Sue Eubanks APRN - CNP    hyoscyamine (LEVSIN/SL) sublingual tablet 0.125 mg, 0.125 mg, SubLINGual, Q4H PRN, Sue Eubanks APRN - CNP    sennosides-docusate sodium (SENOKOT-S) 8.6-50 MG tablet 1 tablet, 1 tablet, Oral, Q12H PRN, Sue Eubanks APRN - CNP    cloZAPine (CLOZARIL) tablet 25 mg, 25 mg, Oral, BID, Nhan Martinez MD    LORazepam (ATIVAN) injection 1 mg, 1 mg, IntraVENous, Q6H, Jennifer Silva APRN - NP, 1 mg at 01/03/25 1224    OLANZapine (ZyPREXA) 5 mg in sterile water 1 mL injection, 5 mg, IntraMUSCular, Once PRN, Jennifer Silva APRN - NP      Examination:  /73   Pulse 84   Temp 98.6 °F (37 °C) (Oral)   Resp 23   Wt 67.7 kg (149 lb 4 oz)   SpO2 94%   BMI 23.87 kg/m²   Gait - steady  Medication side effects(SE):     Mental Status Examination:    Level of consciousness:  within normal limits   Appearance:  poor grooming and poor hygiene  Behavior/Motor:  agitated, pulling at lines, gown, etc., combative, and physically aggressive  Attitude toward examiner:  argumentative and hostile  Speech:  loud and hyperverbal   Mood: angry and irritable  Affect:  angry  Thought processes:  rapid   Thought content:  Homicidal ideation - none  Suicidal Ideation:  Did not answer   Delusions:  paranoid  Perceptual Disturbance:  Does not respond   Cognition:  disoriented   Concentration poor  Insight poor   Judgement poor     ASSESSMENT:   Patient symptoms are:  [] Well controlled  [] Improving  [] Worsening  [x] No change      Diagnosis:   Acute Delirium secondary to acute metabolic encephalopathy  Dementia with behavioral disturbances   R/O acute opioid

## 2025-01-03 NOTE — PROGRESS NOTES
[facility] Neurology   IN-PATIENT SERVICE      NEUROLOGY PROGRESS  NOTE            Date:   1/3/2025  Patient name:  Héctor Tomas  Date of admission:  1/1/2025  YOB: 1965      Interval History:     No acute events.  He remains intermittently agitated.  At time of evaluation, he began yelling and attempted to kick staff.  Remains in restraints.    Further history obtained from his legal guardian Rudolph Laura.  He confirms that patient has history of malignancy, was recently on hospice care.  He also stated he has significant underlying psychiatric disease and is essentially at baseline.    Psychiatry team have been following.  Appears he was previously on clozapine which was stopped and has now been restarted.    History of Present Illness:     59-year-old male with past medical history of schizophrenia, intellectual disability, dementia, hypertension, lung cancer-not currently on therapy, most recently on hospice, who presented with altered mentation.  Neurology consulted for further evaluation.  History extremely limited given patient's mentation.  Obtained primarily from chart review.  Attempted to call his legal guardian, no response.     Patient is a resident of [facility].  Per chart review, appears that he has been aggressive with staff over the last week.  He has been refusing care and medications.  Noted with poor hygiene in the ED.  Appears that he was most recently under hospice due to lung cancer.  Services were discontinued in December.  He was previously taking high doses of morphine every 3-4 hours.  Then, after discontinuation of hospice services about 1 week ago, has not been getting any pain medications.  During this time, his behavior has worsened and he has been aggressive with the staff.     At time of evaluation, he is seen asleep in bed.  He has restraints on.  Per nursing staff, has not been significantly agitated thus far today.    Past Medical History:     Past Medical History:    Diagnosis Date    Adult failure to thrive     Agitation     Bradycardia     Dementia (HCC)     Falls     Gastritis     GERD (gastroesophageal reflux disease)     GI hemorrhage     Hyperlipidemia     Hypertension     Intellectual disability     Lack of coordination     Pneumonia     Restlessness     Schizophrenia with prominent negative symptoms (HCC)     Unsteady         Past Surgical History:     Past Surgical History:   Procedure Laterality Date    COLONOSCOPY N/A 7/19/2021    COLORECTAL CANCER SCREENING TO HEPATIC FLEXURE performed by David Alcazar MD at [facility] OR    ENDOSCOPY, COLON, DIAGNOSTIC  07/19/2021    EGD BIOPSY    UPPER GASTROINTESTINAL ENDOSCOPY N/A 7/19/2021    EGD BIOPSY SMALL BOWEL AND STOMACH AND GE JUNCTION performed by David Alcazar MD at [facility] OR        Medications during admission:      sodium chloride flush  5-40 mL IntraVENous 2 times per day    enoxaparin  40 mg SubCUTAneous Daily    cloZAPine  25 mg Oral BID    LORazepam  1 mg IntraVENous Q6H         Physical Exam:   /73   Pulse 84   Temp 98.6 °F (37 °C) (Oral)   Resp 23   Wt 67.7 kg (149 lb 4 oz)   SpO2 94%   BMI 23.87 kg/m²   No data recorded.      Neurological examination (limited due to agitation):    Mental status   Awake, alert.  Refuses to answer questions.  Verbally and physically aggressive.     Cranial nerves   Unable to assess     Motor function  Moving all 4 extremities equally            Sensory function Unable to assess      Cerebellar Unable to assess      Reflex function Able to assess    Gait                  Unable to assess            Diagnostics:      Laboratory Testing:  CBC:   Recent Labs     01/02/25  0003   WBC 9.0   HGB 13.6        BMP:    Recent Labs     01/02/25  0003   *   K 3.9   *   CO2 18*   BUN 24*   CREATININE 1.4*   GLUCOSE 136*         Lab Results   Component Value Date    ALT 13 01/02/2025    AST 25 01/02/2025    TSH 5.68 (H) 01/02/2025    MG 2.2

## 2025-01-03 NOTE — PROGRESS NOTES
Physical Therapy        Physical Therapy Cancel Note      DATE: 1/3/2025    NAME: Héctor Tomas  MRN: [id number]   : 1965      Patient not seen this date for Physical Therapy due to:    1-3-2025 at 1113:  HOLD PT evaluation. PT evaluation deferred per nurse Arias due to aggressive behavior and language.       Electronically signed by Hoda Hamlin PT on 1/3/2025 at 11:13 AM

## 2025-01-03 NOTE — PROGRESS NOTES
Clozapine ANC Monitoring  Date Result Dose Comments   1/2/24 6700 25 mg bid RDA: [id number]  Weekly monitoring  Next ANC due 1/9/25      Adina Jules, PharmD  PGY-2 Psychiatric Pharmacy Resident    1/3/2025   8:41 AM

## 2025-01-03 NOTE — PROGRESS NOTES
Occupational Therapy  DATE: 1/3/2025    NAME: Héctor Tomas  MRN: [id number]   : 1965    Patient not seen this date for Occupational Therapy due to:      [] Cancel by RN or physician due to:    [] Hemodialysis    [] Critical Lab Value Level     [] Blood transfusion in progress    [] Acute or unstable cardiovascular status   _MAP < 55 or more than >115  _HR < 40 or > 130    [] Acute or unstable pulmonary status   -FiO2 > 60%   _RR < 5 or >40    _O2 sats < 85%    [] Strict Bedrest    [] Off Unit for surgery or procedure    [] Off Unit for testing       [] Pending imaging to R/O fracture    [] Refusal by Patient      [] Intubated    [x] Other  (1-3-2025 at 1113: HOLD OT evaluation. OT evaluation deferred per nurse Arias due to aggressive behavior and language.)       [] OT being discontinued at this time. Patient independent. No further needs.     [] OT being discontinued at this time as the patient has been transferred to hospice care. No further needs.      Essence Kang, MAYKEL, OTR/L
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
## 2025-01-04 PROCEDURE — 2500000003 HC RX 250 WO HCPCS: Performed by: NURSE PRACTITIONER

## 2025-01-04 PROCEDURE — 1200000000 HC SEMI PRIVATE

## 2025-01-04 PROCEDURE — 6360000002 HC RX W HCPCS

## 2025-01-04 PROCEDURE — 99232 SBSQ HOSP IP/OBS MODERATE 35: CPT

## 2025-01-04 PROCEDURE — 6370000000 HC RX 637 (ALT 250 FOR IP): Performed by: PSYCHIATRY & NEUROLOGY

## 2025-01-04 RX ORDER — LORAZEPAM 2 MG/ML
1 INJECTION INTRAMUSCULAR EVERY 4 HOURS PRN
Status: DISCONTINUED | OUTPATIENT
Start: 2025-01-04 | End: 2025-01-10 | Stop reason: HOSPADM

## 2025-01-04 RX ORDER — LORAZEPAM 2 MG/ML
1 INJECTION INTRAMUSCULAR EVERY 4 HOURS
Status: DISCONTINUED | OUTPATIENT
Start: 2025-01-04 | End: 2025-01-04

## 2025-01-04 RX ADMIN — SODIUM CHLORIDE, PRESERVATIVE FREE 10 ML: 5 INJECTION INTRAVENOUS at 20:02

## 2025-01-04 RX ADMIN — CLOZAPINE 25 MG: 25 TABLET ORAL at 20:08

## 2025-01-04 RX ADMIN — LORAZEPAM 1 MG: 2 INJECTION INTRAMUSCULAR; INTRAVENOUS at 00:47

## 2025-01-04 RX ADMIN — LORAZEPAM 1 MG: 2 INJECTION INTRAMUSCULAR; INTRAVENOUS at 08:34

## 2025-01-04 RX ADMIN — CLOZAPINE 25 MG: 25 TABLET ORAL at 15:35

## 2025-01-04 RX ADMIN — LORAZEPAM 1 MG: 2 INJECTION INTRAMUSCULAR; INTRAVENOUS at 04:12

## 2025-01-04 RX ADMIN — SODIUM CHLORIDE, PRESERVATIVE FREE 10 ML: 5 INJECTION INTRAVENOUS at 08:37

## 2025-01-04 NOTE — PROGRESS NOTES
Physical Therapy        Physical Therapy Cancel Note      DATE: 2025    NAME: Héctor Tomas  MRN: [id number]   : 1965      Patient not seen this date for Physical Therapy due to:    2025 at 833- deferred- pt being medicated by nurse Blossom gregory  2025 at 908- PT evaluation deferred due to pt's behavior- pt still screaming and uncooperative.  Will check status tomorrow.       Electronically signed by Hoda Hamlin PT on 2025 at 10:53 AM

## 2025-01-04 NOTE — PROGRESS NOTES
As required by CMS, I notified the attending physician restraints were ordered on 1/3/25 for Héctor Tomas. Acknowledgement of this order by the attending physician was confirmed.

## 2025-01-04 NOTE — PLAN OF CARE
Problem: Safety - Medical Restraint  Goal: Remains free of injury from restraints (Restraint for Interference with Medical Device)  Description: INTERVENTIONS:  1. Determine that other, less restrictive measures have been tried or would not be effective before applying the restraint  2. Evaluate the patient's condition at the time of restraint application  3. Inform patient/family regarding the reason for restraint  4. Q2H: Monitor safety, psychosocial status, comfort, nutrition and hydration  Outcome: Progressing  Flowsheets  Taken 1/4/2025 0030  Remains free of injury from restraints (restraint for interference with medical device):   Determine that other, less restrictive measures have been tried or would not be effective before applying the restraint   Evaluate the patient's condition at the time of restraint application   Inform patient/family regarding the reason for restraint   Every 2 hours: Monitor safety, psychosocial status, comfort, nutrition and hydration  Taken 1/3/2025 1900  Remains free of injury from restraints (restraint for interference with medical device):   Determine that other, less restrictive measures have been tried or would not be effective before applying the restraint   Evaluate the patient's condition at the time of restraint application   Inform patient/family regarding the reason for restraint   Every 2 hours: Monitor safety, psychosocial status, comfort, nutrition and hydration     Problem: Discharge Planning  Goal: Discharge to home or other facility with appropriate resources  Outcome: Progressing  Flowsheets (Taken 1/3/2025 1934)  Discharge to home or other facility with appropriate resources:   Identify barriers to discharge with patient and caregiver   Arrange for needed discharge resources and transportation as appropriate   Identify discharge learning needs (meds, wound care, etc)   Refer to discharge planning if patient needs post-hospital services based on physician order  or complex needs related to functional status, cognitive ability or social support system     Problem: Skin/Tissue Integrity  Goal: Absence of new skin breakdown  Description: 1.  Monitor for areas of redness and/or skin breakdown  Outcome: Progressing     Problem: Safety - Adult  Goal: Free from fall injury  Outcome: Progressing

## 2025-01-04 NOTE — PROGRESS NOTES
[facility] Internal Medicine  Mick Canchola MD; Buddy Corea MD; Lito Lopez MD; MD Chelita Archer MD; Santino Waldrop MD; Anna Aquino MD; Anai Nunez MD    [facility] Internal Medicine   IN-PATIENT SERVICE   [facility]    Progress Note            Date:   1/4/2025  Patient name:  Héctor Tomas  Date of admission:  1/1/2025 11:21 PM  MRN:   [id number]  Account:  [id number]  YOB: 1965  PCP:    Luciano Acosta MD  Room:   2037/2037-01  Code Status:    DNR-CC    Chief Complaint:     Chief Complaint   Patient presents with    Aggressive Behavior        History Obtained From:     patient, electronic medical record    History of Present Illness:     Héctor Tomas is a 59 y.o. Non- / non  male who presents with Aggressive Behavior   and is admitted to the hospital for the management of AMS (altered mental status).  Héctor Tomas is a 59 y.o. Non- / non  male who presents with Aggressive Behavior   And reports that patient is not acting himself.  He is admitted to the hospital for the management of AMS (altered mental status).     Patient's medical history significant for schizophrenia, mentally disabled, failure to thrive in adult, and hypertension.     At time of exam, patient is unable to provide any input into HPI.  When asked where we are, patient responded back asking, \"where are we?\"  Patient states that it is December but does not answer any further questions.     Telephone call placed to patient's ECF.  According to the nurse caring for the patient, patient has become extremely aggressive with staff over the past week.  Nurse reports that this is a new behavior for the patient, as he has never been aggressive with staff in the past.  Nurse reported that patient had been on hospice care for malignant neoplasm of the lung and that these services were recently discontinued in December;

## 2025-01-04 NOTE — PROGRESS NOTES
As required by CMS, I notified the attending physician restraints were ordered on 1/04/25 for Héctor Tomas. Acknowledgement of this order by the attending physician was confirmed.

## 2025-01-04 NOTE — CARE COORDINATION
ONGOING DISCHARGE PLAN:    Patient is alert. Verbally aggressive.     Patient refused to speak with writer.     Sitter at bedside. Bilateral wrist restraints. Kicking at writer while attempting to speak with patient.     Per LSW note, plan is to [facility]     Psych consult-unlikely to benefit from admit. Palliative recommended.     Neuro consult-at baseline    Palliative Care    PT/OT    Will continue to follow for additional discharge needs.    If patient is discharged prior to next notation, then this note serves as note for discharge by case management.    Electronically signed by Isabelle Lozano RN on 1/4/2025 at 8:57 AM

## 2025-01-05 PROCEDURE — 6370000000 HC RX 637 (ALT 250 FOR IP): Performed by: NURSE PRACTITIONER

## 2025-01-05 PROCEDURE — 6360000002 HC RX W HCPCS

## 2025-01-05 PROCEDURE — 6370000000 HC RX 637 (ALT 250 FOR IP): Performed by: PSYCHIATRY & NEUROLOGY

## 2025-01-05 PROCEDURE — 99232 SBSQ HOSP IP/OBS MODERATE 35: CPT

## 2025-01-05 PROCEDURE — 1200000000 HC SEMI PRIVATE

## 2025-01-05 PROCEDURE — 6360000002 HC RX W HCPCS: Performed by: NURSE PRACTITIONER

## 2025-01-05 PROCEDURE — 2500000003 HC RX 250 WO HCPCS: Performed by: NURSE PRACTITIONER

## 2025-01-05 RX ADMIN — LORAZEPAM 1 MG: 2 INJECTION INTRAMUSCULAR; INTRAVENOUS at 14:00

## 2025-01-05 RX ADMIN — CLOZAPINE 25 MG: 25 TABLET ORAL at 08:12

## 2025-01-05 RX ADMIN — ENOXAPARIN SODIUM 40 MG: 100 INJECTION SUBCUTANEOUS at 08:14

## 2025-01-05 RX ADMIN — SODIUM CHLORIDE, PRESERVATIVE FREE 10 ML: 5 INJECTION INTRAVENOUS at 08:13

## 2025-01-05 RX ADMIN — CLOZAPINE 25 MG: 25 TABLET ORAL at 20:42

## 2025-01-05 RX ADMIN — LORAZEPAM 1 MG: 2 INJECTION INTRAMUSCULAR; INTRAVENOUS at 00:43

## 2025-01-05 RX ADMIN — SODIUM CHLORIDE, PRESERVATIVE FREE 10 ML: 5 INJECTION INTRAVENOUS at 20:49

## 2025-01-05 RX ADMIN — LORAZEPAM 1 MG: 2 INJECTION INTRAMUSCULAR; INTRAVENOUS at 20:49

## 2025-01-05 RX ADMIN — ACETAMINOPHEN 650 MG: 325 TABLET ORAL at 21:00

## 2025-01-05 ASSESSMENT — PAIN SCALES - GENERAL: PAINLEVEL_OUTOF10: 0

## 2025-01-05 NOTE — PLAN OF CARE
Problem: Safety - Medical Restraint  Goal: Remains free of injury from restraints (Restraint for Interference with Medical Device)  Description: INTERVENTIONS:  1. Determine that other, less restrictive measures have been tried or would not be effective before applying the restraint  2. Evaluate the patient's condition at the time of restraint application  3. Inform patient/family regarding the reason for restraint  4. Q2H: Monitor safety, psychosocial status, comfort, nutrition and hydration  1/5/2025 1656 by Jordan Balderas RN  Outcome: Progressing  Flowsheets  Taken 1/5/2025 1350  Remains free of injury from restraints (restraint for interference with medical device):   Determine that other, less restrictive measures have been tried or would not be effective before applying the restraint   Every 2 hours: Monitor safety, psychosocial status, comfort, nutrition and hydration  Taken 1/5/2025 1150  Remains free of injury from restraints (restraint for interference with medical device): Determine that other, less restrictive measures have been tried or would not be effective before applying the restraint  Taken 1/5/2025 0945  Remains free of injury from restraints (restraint for interference with medical device):   Determine that other, less restrictive measures have been tried or would not be effective before applying the restraint   Inform patient/family regarding the reason for restraint   Every 2 hours: Monitor safety, psychosocial status, comfort, nutrition and hydration  Taken 1/5/2025 0745  Remains free of injury from restraints (restraint for interference with medical device):   Determine that other, less restrictive measures have been tried or would not be effective before applying the restraint   Every 2 hours: Monitor safety, psychosocial status, comfort, nutrition and hydration     Problem: Discharge Planning  Goal: Discharge to home or other facility with appropriate resources  1/5/2025 1656 by Jordan Balderas

## 2025-01-05 NOTE — PLAN OF CARE
Problem: Safety - Medical Restraint  Goal: Remains free of injury from restraints (Restraint for Interference with Medical Device)  Description: INTERVENTIONS:  1. Determine that other, less restrictive measures have been tried or would not be effective before applying the restraint  2. Evaluate the patient's condition at the time of restraint application  3. Inform patient/family regarding the reason for restraint  4. Q2H: Monitor safety, psychosocial status, comfort, nutrition and hydration  1/5/2025 0515 by Kitty Jauregui RN  Outcome: Progressing  Flowsheets (Taken 1/5/2025 0515)  Remains free of injury from restraints (restraint for interference with medical device):   Determine that other, less restrictive measures have been tried or would not be effective before applying the restraint   Evaluate the patient's condition at the time of restraint application   Every 2 hours: Monitor safety, psychosocial status, comfort, nutrition and hydration  Problem: Discharge Planning  Goal: Discharge to home or other facility with appropriate resources  1/5/2025 0515 by Kitty Jauregui, RN  Outcome: Progressing  Flowsheets (Taken 1/5/2025 0515)  Discharge to home or other facility with appropriate resources:   Identify barriers to discharge with patient and caregiver   Arrange for needed discharge resources and transportation as appropriate   Identify discharge learning needs (meds, wound care, etc)   Refer to discharge planning if patient needs post-hospital services based on physician order or complex needs related to functional status, cognitive ability or social support system  Note: Inform pt. Of discharge teaching and planned. Instructed pt. To inform me if further teaching need to be done.      Problem: Skin/Tissue Integrity  Goal: Absence of new skin breakdown  Description: 1.  Monitor for areas of redness and/or skin breakdown  2.  Assess vascular access sites hourly  3.  Every 4-6 hours minimum:  Change

## 2025-01-05 NOTE — PROGRESS NOTES
As required by CMS, I notified the attending physician restraints were ordered on 1/5/25 for Héctor Tomas. Acknowledgement of this order by the attending physician was confirmed.

## 2025-01-05 NOTE — PROGRESS NOTES
[facility] Medicine  Mick Canchola MD; Buddy Corea MD; Lito Lopez MD; MD Chelita Archer MD; Santino Waldrop MD; Anna Aquino MD; Anai Nunez MD    [facility]   IN-PATIENT SERVICE   [facility]    Progress Note            Date:   1/5/2025  Patient name:  Héctor Tomas  Date of admission:  1/1/2025 11:21 PM  MRN:   [id number]  Account:  [id number]  YOB: 1965  PCP:    Luciano Acosta MD  Room:   2037/2037-01  Code Status:    DNR-CC    Chief Complaint:     Chief Complaint   Patient presents with    Aggressive Behavior        History Obtained From:     patient, electronic medical record    History of Present Illness:     Héctor Tomas is a 59 y.o. Non- / non  male who presents with Aggressive Behavior   and is admitted to the hospital for the management of AMS (altered mental status).  Héctor Tomas is a 59 y.o. Non- / non  male who presents with Aggressive Behavior   And reports that patient is not acting himself.  He is admitted to the hospital for the management of AMS (altered mental status).     Patient's medical history significant for schizophrenia, mentally disabled, failure to thrive in adult, and hypertension.     At time of exam, patient is unable to provide any input into HPI.  When asked where we are, patient responded back asking, \"where are we?\"  Patient states that it is December but does not answer any further questions.     Telephone call placed to patient's ECF.  According to the nurse caring for the patient, patient has become extremely aggressive with staff over the past week.  Nurse reports that this is a new behavior for the patient, as he has never been aggressive with staff in the past.  Nurse reported that patient had been on hospice care for malignant neoplasm of the lung and that these services were recently discontinued in December;

## 2025-01-05 NOTE — CARE COORDINATION
ONGOING DISCHARGE PLAN:      Sitter at bedside. Bilateral wrist restraints.     Per LSW note, plan is to return to  [facility].     Psych consult-unlikely to benefit from admit. Palliative recommended.     Neuro consult-at baseline    Palliative Care    PT/OT    Will continue to follow for additional discharge needs.    If patient is discharged prior to next notation, then this note serves as note for discharge by case management.    Electronically signed by Isabelle Lozano RN on 1/5/2025 at 12:42 PM

## 2025-01-05 NOTE — PROGRESS NOTES
Physical Therapy        Physical Therapy Cancel Note      DATE: 2025    NAME: Héctor Tomas  MRN: [id number]   : 1965      Patient not seen this date for Physical Therapy due to:    Pt not appropriate for PT eval at this time due to agitation and in restraints    Electronically signed by Tonja Bro PT on 2025 at 8:09 AM

## 2025-01-06 ENCOUNTER — APPOINTMENT (OUTPATIENT)
Dept: GENERAL RADIOLOGY | Age: 60
End: 2025-01-06
Payer: COMMERCIAL

## 2025-01-06 PROBLEM — Z51.5 PALLIATIVE CARE ENCOUNTER: Status: ACTIVE | Noted: 2025-01-06

## 2025-01-06 PROCEDURE — 6360000002 HC RX W HCPCS

## 2025-01-06 PROCEDURE — 71045 X-RAY EXAM CHEST 1 VIEW: CPT

## 2025-01-06 PROCEDURE — 6370000000 HC RX 637 (ALT 250 FOR IP): Performed by: PSYCHIATRY & NEUROLOGY

## 2025-01-06 PROCEDURE — 99223 1ST HOSP IP/OBS HIGH 75: CPT | Performed by: INTERNAL MEDICINE

## 2025-01-06 PROCEDURE — 2500000003 HC RX 250 WO HCPCS: Performed by: NURSE PRACTITIONER

## 2025-01-06 PROCEDURE — 99233 SBSQ HOSP IP/OBS HIGH 50: CPT | Performed by: INTERNAL MEDICINE

## 2025-01-06 PROCEDURE — 1200000000 HC SEMI PRIVATE

## 2025-01-06 PROCEDURE — 99222 1ST HOSP IP/OBS MODERATE 55: CPT | Performed by: NURSE PRACTITIONER

## 2025-01-06 RX ADMIN — CLOZAPINE 25 MG: 25 TABLET ORAL at 09:48

## 2025-01-06 RX ADMIN — CLOZAPINE 25 MG: 25 TABLET ORAL at 22:04

## 2025-01-06 RX ADMIN — LORAZEPAM 1 MG: 2 INJECTION INTRAMUSCULAR; INTRAVENOUS at 09:59

## 2025-01-06 RX ADMIN — SODIUM CHLORIDE, PRESERVATIVE FREE 10 ML: 5 INJECTION INTRAVENOUS at 09:54

## 2025-01-06 NOTE — PROGRESS NOTES
Physical Therapy        Physical Therapy Cancel Note      DATE: 2025    NAME: Héctor Tomas  MRN: [id number]   : 1965      Patient not seen this date for Physical Therapy due to:    2025 at 900-HOLD PT evaluation.  Pt remains in restraints due to aggressive behavior.  Will check status tomorrow.       Electronically signed by Hoda Hamlin PT on 2025 at 9:05 AM

## 2025-01-06 NOTE — PLAN OF CARE
Problem: Safety - Medical Restraint  Goal: Remains free of injury from restraints (Restraint for Interference with Medical Device)  Description: INTERVENTIONS:  1. Determine that other, less restrictive measures have been tried or would not be effective before applying the restraint  2. Evaluate the patient's condition at the time of restraint application  3. Inform patient/family regarding the reason for restraint  4. Q2H: Monitor safety, psychosocial status, comfort, nutrition and hydration  1/6/2025 0420 by Kitty Jauregui RN  Outcome: Progressing  Flowsheets  Taken 1/6/2025 0420 by Kitty Jauregui RN  Remains free of injury from restraints (restraint for interference with medical device):   Determine that other, less restrictive measures have been tried or would not be effective before applying the restraint   Evaluate the patient's condition at the time of restraint application   Every 2 hours: Monitor safety, psychosocial status, comfort, nutrition and hydration  Taken 1/5/2025 1750 by Jordan Balderas RN  Remains free of injury from restraints (restraint for interference with medical device): Determine that other, less restrictive measures have been tried or would not be effective before applying the restraint     Problem: Discharge Planning  Goal: Discharge to home or other facility with appropriate resources  1/6/2025 0420 by Kitty Jauregui RN  Outcome: Progressing  Flowsheets (Taken 1/6/2025 0420)  Discharge to home or other facility with appropriate resources:   Identify barriers to discharge with patient and caregiver   Arrange for needed discharge resources and transportation as appropriate   Identify discharge learning needs (meds, wound care, etc)   Refer to discharge planning if patient needs post-hospital services based on physician order or complex needs related to functional status, cognitive ability or social support system  Note: Inform pt. Of discharge teaching and planned. Instructed pt.

## 2025-01-06 NOTE — PROGRESS NOTES
[facility]   OCCUPATIONAL THERAPY MISSED TREATMENT NOTE   INPATIENT   Date: 25  Patient Name: Héctor Tomas       Room:   MRN: [id number]   Account #: [id number]    : 1965  (59 y.o.)  Gender: male                 REASON FOR MISSED TREATMENT:  1103: Hold OT evaluation. Per chart review, pt remains in restraints due to aggressive behavior. Will continue to follow and check back tomorrow.     Electronically signed by MARGAUX Garcia on 25 at 11:02 AM EST

## 2025-01-06 NOTE — CARE COORDINATION
DISCHARGE PLANNING NOTE:    Writer is following for potential discharge to [facility]. Pt currently has a 1:1 sitter and wrist restraints.    Writer received call from Samantha with [facility] ([phone]). Samantha confirms pt was being followed by [facility] and graduated due to being stable and showing no signs of decline. [facility] was following this pt on a weekly basis and did not receive notification from SNF that pt was admitted at [facility]. Writer sent contact information to JEAN PIERRE Kamara with palliative to confirm contact for [facility] so palliative can discuss this pt.  Electronically signed by HANNA Trevizo on 1/6/2025 at 3:51 PM

## 2025-01-06 NOTE — PROGRESS NOTES
[facility] Internal Medicine  Mick Canchola MD; Buddy Corea MD; Lito Lopez MD; MD Chelita Archer MD; Santino Waldrop MD; Anna Aquino MD; Anai Nunez MD    [facility] Internal Medicine   IN-PATIENT SERVICE   [facility]    Progress Note            Date:   1/6/2025  Patient name:  Héctor Tomas  Date of admission:  1/1/2025 11:21 PM  MRN:   [id number]  Account:  [id number]  YOB: 1965  PCP:    Luciano Acosta MD  Room:   2037/2037-01  Code Status:    DNR-CC    Chief Complaint:     Chief Complaint   Patient presents with    Aggressive Behavior        History Obtained From:     patient, electronic medical record    History of Present Illness:     Héctor Tomas is a 59 y.o. Non- / non  male who presents with Aggressive Behavior   and is admitted to the hospital for the management of AMS (altered mental status).  Héctor Tomas is a 59 y.o. Non- / non  male who presents with Aggressive Behavior   And reports that patient is not acting himself.  He is admitted to the hospital for the management of AMS (altered mental status).     Patient's medical history significant for schizophrenia, mentally disabled, failure to thrive in adult, and hypertension.     At time of exam, patient is unable to provide any input into HPI.  When asked where we are, patient responded back asking, \"where are we?\"  Patient states that it is December but does not answer any further questions.     Telephone call placed to patient's ECF.  According to the nurse caring for the patient, patient has become extremely aggressive with staff over the past week.  Nurse reports that this is a new behavior for the patient, as he has never been aggressive with staff in the past.  Nurse reported that patient had been on hospice care for malignant neoplasm of the lung and that these services were recently discontinued in December;

## 2025-01-06 NOTE — PROGRESS NOTES
Writer inform NPBrandee that pt HR and respiration were elevated, but the pt is resting comfortable. NO s/s of distress noted at this time. NP responded as long as no distress noted continues care of plan. NP also suggested to put pt on 1.5L of O2 for comfort.

## 2025-01-06 NOTE — ACP (ADVANCE CARE PLANNING)
..Advance Care Planning     Palliative Team Advance Care Planning (ACP) Conversation    Date of Conversation: 01/06/25    Individuals present for the conversation:  Patient is confused and combative and he has a legal guardian Rudolph Laura      ACP documents on file prior to discussion:  -None    Previously completed document/s not on file: Unknown, or patient / participant is uncertain.    Healthcare Decision Maker:    Primary Decision Maker: Rudolph Laura - Other, Legal Guardian - [phone]     Conversation Summary:  The patient's legal guardian is named in the chart.     Resuscitation Status:   Code Status: DNR-CC     Documentation Completed:  -No new documents completed.      Anh Yeboah RN

## 2025-01-07 LAB
ALBUMIN SERPL-MCNC: 3.2 G/DL (ref 3.5–5.2)
ALP SERPL-CCNC: 104 U/L (ref 40–129)
ALT SERPL-CCNC: 18 U/L (ref 10–50)
ANION GAP SERPL CALCULATED.3IONS-SCNC: 11 MMOL/L (ref 9–16)
AST SERPL-CCNC: 23 U/L (ref 10–50)
BILIRUB SERPL-MCNC: 0.3 MG/DL (ref 0–1.2)
BUN SERPL-MCNC: 17 MG/DL (ref 8–23)
CALCIUM SERPL-MCNC: 8.9 MG/DL (ref 8.6–10.4)
CHLORIDE SERPL-SCNC: 104 MMOL/L (ref 98–107)
CO2 SERPL-SCNC: 23 MMOL/L (ref 20–31)
CREAT SERPL-MCNC: 1.1 MG/DL (ref 0.7–1.2)
ERYTHROCYTE [DISTWIDTH] IN BLOOD BY AUTOMATED COUNT: 13.5 % (ref 11.5–14.9)
GFR, ESTIMATED: 76 ML/MIN/1.73M2
GLUCOSE SERPL-MCNC: 187 MG/DL (ref 74–99)
HCT VFR BLD AUTO: 45.5 % (ref 41–53)
HGB BLD-MCNC: 14.9 G/DL (ref 13.5–17.5)
MCH RBC QN AUTO: 28.8 PG (ref 26–34)
MCHC RBC AUTO-ENTMCNC: 32.8 G/DL (ref 31–37)
MCV RBC AUTO: 87.7 FL (ref 80–100)
PLATELET # BLD AUTO: 289 K/UL (ref 150–450)
PMV BLD AUTO: 8.2 FL (ref 6–12)
POTASSIUM SERPL-SCNC: 4.2 MMOL/L (ref 3.7–5.3)
PROT SERPL-MCNC: 6.9 G/DL (ref 6.6–8.7)
RBC # BLD AUTO: 5.19 M/UL (ref 4.5–5.9)
SODIUM SERPL-SCNC: 138 MMOL/L (ref 136–145)
WBC OTHER # BLD: 15.3 K/UL (ref 3.5–11)

## 2025-01-07 PROCEDURE — 99232 SBSQ HOSP IP/OBS MODERATE 35: CPT | Performed by: INTERNAL MEDICINE

## 2025-01-07 PROCEDURE — 1200000000 HC SEMI PRIVATE

## 2025-01-07 PROCEDURE — 36415 COLL VENOUS BLD VENIPUNCTURE: CPT

## 2025-01-07 PROCEDURE — 80053 COMPREHEN METABOLIC PANEL: CPT

## 2025-01-07 PROCEDURE — 6360000002 HC RX W HCPCS: Performed by: INTERNAL MEDICINE

## 2025-01-07 PROCEDURE — 6370000000 HC RX 637 (ALT 250 FOR IP): Performed by: PSYCHIATRY & NEUROLOGY

## 2025-01-07 PROCEDURE — 99232 SBSQ HOSP IP/OBS MODERATE 35: CPT | Performed by: PSYCHIATRY & NEUROLOGY

## 2025-01-07 PROCEDURE — 6360000002 HC RX W HCPCS: Performed by: NURSE PRACTITIONER

## 2025-01-07 PROCEDURE — 2500000003 HC RX 250 WO HCPCS: Performed by: NURSE PRACTITIONER

## 2025-01-07 PROCEDURE — 6360000002 HC RX W HCPCS

## 2025-01-07 PROCEDURE — 85027 COMPLETE CBC AUTOMATED: CPT

## 2025-01-07 PROCEDURE — 6370000000 HC RX 637 (ALT 250 FOR IP): Performed by: NURSE PRACTITIONER

## 2025-01-07 RX ORDER — CLOZAPINE 25 MG/1
50 TABLET ORAL 2 TIMES DAILY
Status: DISCONTINUED | OUTPATIENT
Start: 2025-01-08 | End: 2025-01-10 | Stop reason: HOSPADM

## 2025-01-07 RX ORDER — MORPHINE SULFATE 2 MG/ML
2 INJECTION, SOLUTION INTRAMUSCULAR; INTRAVENOUS EVERY 4 HOURS PRN
Status: DISCONTINUED | OUTPATIENT
Start: 2025-01-07 | End: 2025-01-08

## 2025-01-07 RX ADMIN — CLOZAPINE 25 MG: 25 TABLET ORAL at 21:21

## 2025-01-07 RX ADMIN — SODIUM CHLORIDE, PRESERVATIVE FREE 10 ML: 5 INJECTION INTRAVENOUS at 21:21

## 2025-01-07 RX ADMIN — MORPHINE SULFATE 2 MG: 2 INJECTION, SOLUTION INTRAMUSCULAR; INTRAVENOUS at 18:19

## 2025-01-07 RX ADMIN — SODIUM CHLORIDE, PRESERVATIVE FREE 10 ML: 5 INJECTION INTRAVENOUS at 09:46

## 2025-01-07 RX ADMIN — ACETAMINOPHEN 650 MG: 650 SUPPOSITORY RECTAL at 18:17

## 2025-01-07 RX ADMIN — CLOZAPINE 25 MG: 25 TABLET ORAL at 09:45

## 2025-01-07 RX ADMIN — LORAZEPAM 1 MG: 2 INJECTION INTRAMUSCULAR; INTRAVENOUS at 09:45

## 2025-01-07 RX ADMIN — ENOXAPARIN SODIUM 40 MG: 100 INJECTION SUBCUTANEOUS at 09:45

## 2025-01-07 ASSESSMENT — PAIN SCALES - GENERAL: PAINLEVEL_OUTOF10: 7

## 2025-01-07 ASSESSMENT — PAIN DESCRIPTION - LOCATION: LOCATION: GENERALIZED

## 2025-01-07 NOTE — PROGRESS NOTES
Physical Therapy        Physical Therapy Cancel Note      DATE: 2025    NAME: Héctor Tomas  MRN: [id number]   : 1965      Patient not seen this date for Physical Therapy due to:    Other: Pt sleeping, will check back as able. 1000      Electronically signed by Kristie Dyson PT on 2025 at 10:11 AM

## 2025-01-07 NOTE — PROGRESS NOTES
ALENA Mata NP was notified of elevated VS: T, P, R & interventions of medication & ice packs.  She advised to continue to monitor patient & provide comfort care.

## 2025-01-07 NOTE — PLAN OF CARE
Problem: Safety - Medical Restraint  Goal: Remains free of injury from restraints (Restraint for Interference with Medical Device)  Description: INTERVENTIONS:  1. Determine that other, less restrictive measures have been tried or would not be effective before applying the restraint  2. Evaluate the patient's condition at the time of restraint application  3. Inform patient/family regarding the reason for restraint  4. Q2H: Monitor safety, psychosocial status, comfort, nutrition and hydration  1/7/2025 1643 by Amy Clifton RN  Outcome: Progressing  1/7/2025 0637 by Anamaria Jay RN  Outcome: Progressing  Flowsheets  Taken 1/7/2025 0203  Remains free of injury from restraints (restraint for interference with medical device):   Determine that other, less restrictive measures have been tried or would not be effective before applying the restraint   Every 2 hours: Monitor safety, psychosocial status, comfort, nutrition and hydration  Taken 1/7/2025 0100  Remains free of injury from restraints (restraint for interference with medical device):   Every 2 hours: Monitor safety, psychosocial status, comfort, nutrition and hydration   Determine that other, less restrictive measures have been tried or would not be effective before applying the restraint  Taken 1/7/2025 0025  Remains free of injury from restraints (restraint for interference with medical device):   Every 2 hours: Monitor safety, psychosocial status, comfort, nutrition and hydration   Inform patient/family regarding the reason for restraint     Problem: Discharge Planning  Goal: Discharge to home or other facility with appropriate resources  1/7/2025 1643 by Amy Clifton RN  Outcome: Progressing  1/7/2025 0637 by Anamaria Jay RN  Outcome: Progressing     Problem: Skin/Tissue Integrity  Goal: Absence of new skin breakdown  Description: 1.  Monitor for areas of redness and/or skin breakdown  2.  Assess vascular access sites hourly  3.  Every

## 2025-01-07 NOTE — PROGRESS NOTES
I reach to Samantha Shoemaker for [facility] at [phone].  We talk about the patient and his hospice care with [facility] for the last 2 years that care was provided at the [facility].     I ask about the protocol to reinstate his hospice care , as the patient was on Morphine 10 mg every 3- 4 hours for the past 2 years for malignant neoplasm of the lung. Per Samantha the patient was not declining and hospice services were stopped 12-24-24. Morphine sulfate 10 mg doses was discontinued. Per the psychiatric consultation the COWS assessment is ordered to monitor for symptoms of opiate withdrawal.     Samantha gives me a contact for the [facility] hospice team for questions that our palliative care team or Gretta APRIL may have.   [facility] team office number [phone] and the  [place] her number is [phone].         Will follow for goals of care and support.       ..[facility] Palliative Care  Anh Yeboah RN,CHPN   [facility]: [phone]  [facility]: [phone]  [facility]: [phone]

## 2025-01-07 NOTE — PROGRESS NOTES
BEHAVIORAL HEALTH FOLLOW-UP NOTE     1/7/2025     Patient was seen and examined in person, Chart reviewed   Patient's case discussed with staff/team    Chief Complaint: Acute Delirium secondary to acute metabolic encephalopathy  Dementia with behavioral disturbances   R/O acute opioid withdrawal    Interim History:     Psych reconsulted as patient is still requiring one-on-one sitter.  Patient continues to be aggressive and requiring restraints.  Tried removing one-on-one sitter however unsuccessful.  During last psych evaluation patient was given risperidone injection as he was refusing clozapine per oral.  On chart review patient continues to receive clozapine twice daily even after receiving risperidone injection.    Patient patient was seen and examined at bedside.  Patient appears less aggressive and frustrated as he did during initial evaluation.  However patient was difficult to arouse to his name or stimuli.  Patient was still in restraints.  Patient is requiring 2 L of nasal cannula oxygen which was not recorded during previous visit.  Patient also appears to be tachypneic and on chart review tachycardic.      Upon questioning patient screams get the hell out of here and starts asking why she is being kept here.  Patient is ANO x x 1.  Is only able to respond that is December 2024.  He is refusing to respond to any other questions by writer.  On talking to sitter    BP (!) 125/92   Pulse (!) 116   Temp 98.4 °F (36.9 °C)   Resp (!) 34 Comment: PCT and RN notified by writer.  Ht 1.778 m (5' 10\")   Wt 67.7 kg (149 lb 4 oz)   SpO2 92%   BMI 21.42 kg/m²   Appetite:  [] Normal/Unchanged  [] Increased  [x] Decreased      Sleep:       [] Normal/Unchanged  [] Fair       [x] Poor              Energy:    [] Normal/Unchanged  [] Increased  [] Decreased        Aggression:  [] yes  [x] no    Patient is [] able  [x] unable to CONTRACT FOR SAFETY ON THE UNIT    PAST MEDICAL/PSYCHIATRIC HISTORY:   Past Medical

## 2025-01-07 NOTE — PROGRESS NOTES
Physical Therapy        Physical Therapy Cancel Note      DATE: 2025    NAME: Héctor Tomas  MRN: [id number]   : 1965      Patient not seen this date for Physical Therapy due to:    Patient Declined: 2nd attempt, pt resting and minimally responding to gentle attempts of questions (use of walker, getting out of bed, etc) minimal participation, pt yelled at writer to GET the \" \" OUT! 1312      Electronically signed by Kristie Dyson PT on 2025 at 2:42 PM

## 2025-01-07 NOTE — PROGRESS NOTES
01/07/25 1100   Encounter Summary   Encounter Overview/Reason Spiritual/Emotional Needs   Service Provided For Patient   Referral/Consult From Palliative Care   Last Encounter  01/07/25   Complexity of Encounter Moderate   Spiritual/Emotional needs   Type Spiritual Support   Palliative Care   Type Palliative Care, Follow-up   Assessment/Intervention/Outcome   Assessment Unable to assess  (Sleeping)   Intervention Prayer (assurance of)/[place]

## 2025-01-07 NOTE — PROGRESS NOTES
Dr. Juan harris served to re-evaluate patient.  Patient continues to be 1:1 (attempted to remove last night, and was not possible), in restraints, and aggressive.

## 2025-01-07 NOTE — PLAN OF CARE
Problem: Safety - Medical Restraint  Goal: Remains free of injury from restraints (Restraint for Interference with Medical Device)  Description: INTERVENTIONS:  1. Determine that other, less restrictive measures have been tried or would not be effective before applying the restraint  2. Evaluate the patient's condition at the time of restraint application  3. Inform patient/family regarding the reason for restraint  4. Q2H: Monitor safety, psychosocial status, comfort, nutrition and hydration  Outcome: Progressing     Problem: Discharge Planning  Goal: Discharge to home or other facility with appropriate resources  Outcome: Progressing  Flowsheets (Taken 1/6/2025 1051)  Discharge to home or other facility with appropriate resources:   Identify barriers to discharge with patient and caregiver   Arrange for needed discharge resources and transportation as appropriate   Identify discharge learning needs (meds, wound care, etc)   Refer to discharge planning if patient needs post-hospital services based on physician order or complex needs related to functional status, cognitive ability or social support system     Problem: Skin/Tissue Integrity  Goal: Absence of new skin breakdown  Description: 1.  Monitor for areas of redness and/or skin breakdown  2.  Assess vascular access sites hourly  3.  Every 4-6 hours minimum:  Change oxygen saturation probe site  4.  Every 4-6 hours:  If on nasal continuous positive airway pressure, respiratory therapy assess nares and determine need for appliance change or resting period.  Outcome: Progressing     Problem: Safety - Adult  Goal: Free from fall injury  Outcome: Progressing  Flowsheets (Taken 1/6/2025 1938)  Free From Fall Injury: Instruct family/caregiver on patient safety     Problem: Pain  Goal: Verbalizes/displays adequate comfort level or baseline comfort level  Outcome: Progressing

## 2025-01-07 NOTE — PROGRESS NOTES
[facility]   OCCUPATIONAL THERAPY MISSED TREATMENT NOTE   INPATIENT   Date: 25  Patient Name: Héctor Tomas       Room:   MRN: [id number]   Account #: [id number]    : 1965  (59 y.o.)  Gender: male                 REASON FOR MISSED TREATMENT:  Patient declined   -    2nd attempt, pt resting and minimally responding to gentle attempts of questions (use of walker, getting out of bed, etc) minimal participation, pt yelled at writer to GET the \" \" OUT! 1312             Electronically signed by MARGAUX Roldan on 25 at 3:43 PM EST

## 2025-01-07 NOTE — PROGRESS NOTES
[facility] Internal Medicine  Mick Canchola MD; Buddy Corea MD; Lito Lopez MD; MD Chelita Archer MD; Santino Waldrop MD; Anna Aquino MD; Anai Nunez MD    [facility] Internal Medicine   IN-PATIENT SERVICE   [facility]    Progress Note            Date:   1/7/2025  Patient name:  Héctor Tomas  Date of admission:  1/1/2025 11:21 PM  MRN:   [id number]  Account:  [id number]  YOB: 1965  PCP:    Luciano Acosta MD  Room:   2037/2037-01  Code Status:    DNR-CC    Chief Complaint:     Chief Complaint   Patient presents with    Aggressive Behavior        History Obtained From:     patient, electronic medical record    History of Present Illness:     Héctor Tomas is a 59 y.o. Non- / non  male who presents with Aggressive Behavior   and is admitted to the hospital for the management of AMS (altered mental status).  Héctor Tomas is a 59 y.o. Non- / non  male who presents with Aggressive Behavior   And reports that patient is not acting himself.  He is admitted to the hospital for the management of AMS (altered mental status).     Patient's medical history significant for schizophrenia, mentally disabled, failure to thrive in adult, and hypertension.     At time of exam, patient is unable to provide any input into HPI.  When asked where we are, patient responded back asking, \"where are we?\"  Patient states that it is December but does not answer any further questions.     Telephone call placed to patient's ECF.  According to the nurse caring for the patient, patient has become extremely aggressive with staff over the past week.  Nurse reports that this is a new behavior for the patient, as he has never been aggressive with staff in the past.  Nurse reported that patient had been on hospice care for malignant neoplasm of the lung and that these services were recently discontinued in December;  ECF with agitation.  Admitted for the management of acute metabolic encephalopathy.  Acute metabolic encephalopathy, unknown cause.  Unknown baseline.  Pretty agitated.  Psych and neurology consult.  Agitated, currently under restraints.  TAMARA.  Continue fluids, urinalysis not concerning for UTI.  Check CK.  History of dementia.  History of schizophrenia.  DVT prophylaxis    1/3.  Patient seen and examined at bedside.  Patient Stillinger restraints,  Tried to obtain history, patient yelling to get out of here.  Continues to be on fluids, sitter in place.  Labs are pending for today.   If pending labs look unremarkable, patient will be ready for discharge, needs [facility] on  discharge.      1/6  Patient seen and examined  Continues to be in restraints, patient is yelling as well  Was placed on a seizure and yesterday was getting hypoxic x-ray consistent with pneumonitis versus bronchiolitis, patient was yelling on my encounter, chest sound was clear will check labs check upper respiratory panel, start Z-Sergio for now  As per report patient was hospice on the other facility, not known why he was in hospice severe dementia?  Will reach out to facility,  Difficult discharge due to his mentation    1/7  No significant change, patient was getting very agitated today when I went to examine him was screaming, was getting tachycardic tachypneic, palliative notes reviewed patient was on morphine, not sure if he is having pain since very poor historian, will place order for IV morphine patient not taking anything p.o. currently,  Psych reconsulted, patient in restraints, tried to take it off last night with no help  Labs were ordered but could not get get it done since patient has been kicking and hitting  Overall prognosis is very poor, difficult discharge and placement    Consultations:   IP CONSULT TO PSYCHIATRY  IP CONSULT TO SOCIAL WORK  IP CONSULT TO NEUROLOGY  IP CONSULT TO PALLIATIVE CARE    Anna Aquino MD  1/7/2025  2:21

## 2025-01-07 NOTE — CARE COORDINATION
DISCHARGE PLANNING NOTE:    Writer is following for potential discharge placement. Pt admitted from [facility]. Pt currently has 1:1 sitter and wrist restraints, unable to return to SNF with these precautions in place.     Writer placed perfectserve message to RN Anh with palliative care for update regarding message relayed to NP Gretta with palliative regarding pt being on services with [facility].  Electronically signed by HANNA Trevizo on 1/7/2025 at 11:39 AM

## 2025-01-07 NOTE — PLAN OF CARE
Problem: Safety - Medical Restraint  Goal: Remains free of injury from restraints (Restraint for Interference with Medical Device)  Description: INTERVENTIONS:  1. Determine that other, less restrictive measures have been tried or would not be effective before applying the restraint  2. Evaluate the patient's condition at the time of restraint application  3. Inform patient/family regarding the reason for restraint  4. Q2H: Monitor safety, psychosocial status, comfort, nutrition and hydration  1/7/2025 0637 by Anamaria Jay RN  Outcome: Progressing  Flowsheets  Taken 1/7/2025 0203  Remains free of injury from restraints (restraint for interference with medical device):   Determine that other, less restrictive measures have been tried or would not be effective before applying the restraint   Every 2 hours: Monitor safety, psychosocial status, comfort, nutrition and hydration  Taken 1/7/2025 0100  Remains free of injury from restraints (restraint for interference with medical device):   Every 2 hours: Monitor safety, psychosocial status, comfort, nutrition and hydration   Determine that other, less restrictive measures have been tried or would not be effective before applying the restraint  Taken 1/7/2025 0025  Remains free of injury from restraints (restraint for interference with medical device):   Every 2 hours: Monitor safety, psychosocial status, comfort, nutrition and hydration   Inform patient/family regarding the reason for restraint  1/6/2025 1939 by Albina Cardoso RN  Outcome: Progressing     Problem: Discharge Planning  Goal: Discharge to home or other facility with appropriate resources  1/7/2025 0637 by Anamaria Jay, RN  Outcome: Progressing  1/6/2025 1939 by Albina Cardoso, RN  Outcome: Progressing  Flowsheets (Taken 1/6/2025 1051)  Discharge to home or other facility with appropriate resources:   Identify barriers to discharge with patient and caregiver   Arrange for needed discharge

## 2025-01-07 NOTE — PROGRESS NOTES
[facility]   OCCUPATIONAL THERAPY MISSED TREATMENT NOTE   INPATIENT   Date: 25  Patient Name: Héctor Tomas       Room:   MRN: [id number]   Account #: [id number]    : 1965  (59 y.o.)  Gender: male                 REASON FOR MISSED TREATMENT:  Patient unable to participate   -    Pt is sleeping and nurse reporting waiting and checking back later, will check back as able. 1000             Electronically signed by MARGAUX Roldan on 25 at 11:35 AM EST

## 2025-01-08 ENCOUNTER — APPOINTMENT (OUTPATIENT)
Dept: GENERAL RADIOLOGY | Age: 60
End: 2025-01-08
Payer: COMMERCIAL

## 2025-01-08 LAB
BILIRUB UR QL STRIP: NEGATIVE
CLARITY UR: CLEAR
COLOR UR: YELLOW
COMMENT: NORMAL
GLUCOSE UR STRIP-MCNC: NEGATIVE MG/DL
HGB UR QL STRIP.AUTO: NEGATIVE
KETONES UR STRIP-MCNC: NEGATIVE MG/DL
LEUKOCYTE ESTERASE UR QL STRIP: NEGATIVE
NITRITE UR QL STRIP: NEGATIVE
PH UR STRIP: 6 [PH] (ref 5–8)
PROT UR STRIP-MCNC: NEGATIVE MG/DL
SP GR UR STRIP: 1.02 (ref 1–1.03)
UROBILINOGEN UR STRIP-ACNC: NORMAL EU/DL (ref 0–1)

## 2025-01-08 PROCEDURE — 6370000000 HC RX 637 (ALT 250 FOR IP): Performed by: PSYCHIATRY & NEUROLOGY

## 2025-01-08 PROCEDURE — 2580000003 HC RX 258: Performed by: INTERNAL MEDICINE

## 2025-01-08 PROCEDURE — 2500000003 HC RX 250 WO HCPCS: Performed by: NURSE PRACTITIONER

## 2025-01-08 PROCEDURE — 36415 COLL VENOUS BLD VENIPUNCTURE: CPT

## 2025-01-08 PROCEDURE — 6360000002 HC RX W HCPCS: Performed by: INTERNAL MEDICINE

## 2025-01-08 PROCEDURE — 99232 SBSQ HOSP IP/OBS MODERATE 35: CPT | Performed by: PSYCHIATRY & NEUROLOGY

## 2025-01-08 PROCEDURE — 2580000003 HC RX 258: Performed by: NURSE PRACTITIONER

## 2025-01-08 PROCEDURE — 71045 X-RAY EXAM CHEST 1 VIEW: CPT

## 2025-01-08 PROCEDURE — 99233 SBSQ HOSP IP/OBS HIGH 50: CPT | Performed by: INTERNAL MEDICINE

## 2025-01-08 PROCEDURE — 87040 BLOOD CULTURE FOR BACTERIA: CPT

## 2025-01-08 PROCEDURE — 81003 URINALYSIS AUTO W/O SCOPE: CPT

## 2025-01-08 PROCEDURE — 1200000000 HC SEMI PRIVATE

## 2025-01-08 RX ORDER — MORPHINE SULFATE 2 MG/ML
1 INJECTION, SOLUTION INTRAMUSCULAR; INTRAVENOUS EVERY 4 HOURS PRN
Status: DISCONTINUED | OUTPATIENT
Start: 2025-01-08 | End: 2025-01-10 | Stop reason: HOSPADM

## 2025-01-08 RX ADMIN — SODIUM CHLORIDE, PRESERVATIVE FREE 10 ML: 5 INJECTION INTRAVENOUS at 09:27

## 2025-01-08 RX ADMIN — PIPERACILLIN AND TAZOBACTAM 4500 MG: 4; .5 INJECTION, POWDER, FOR SOLUTION INTRAVENOUS at 11:47

## 2025-01-08 RX ADMIN — CLOZAPINE 50 MG: 25 TABLET ORAL at 09:27

## 2025-01-08 RX ADMIN — PIPERACILLIN AND TAZOBACTAM 3375 MG: 3; .375 INJECTION, POWDER, LYOPHILIZED, FOR SOLUTION INTRAVENOUS at 17:58

## 2025-01-08 RX ADMIN — SODIUM CHLORIDE: 9 INJECTION, SOLUTION INTRAVENOUS at 11:58

## 2025-01-08 NOTE — CARE COORDINATION
DISCHARGE PLANNING NOTE:    Writer is following for potential discharge placement. Pt admitted from [facility]. Pt currently has a 1:1 sitter, no restraints in place.     Call placed to [facility] for [place]. No answer, no options for voicemail.  Electronically signed by HANNA Trevizo on 1/8/2025 at 1:10 PM    Writer spoke to Briana with admissions. [facility] Briana verifies pt must be 24 hours sitter free prior to return to facility.  Electronically signed by HANNA Trevizo on 1/8/2025 at 1:16 PM

## 2025-01-08 NOTE — PROGRESS NOTES
BEHAVIORAL HEALTH FOLLOW-UP NOTE     1/8/2025     Patient was seen and examined in person, Chart reviewed   Patient's case discussed with staff/team    Chief Complaint: Acute Delirium secondary to acute metabolic encephalopathy  Dementia with behavioral disturbances   R/O acute opioid withdrawal    Interim History:     Over past 24 hours patient has been tachycardic, febrile and tachypneic requiring 2 L of nasal cannula oxygen.  White cell count elevated 15.3 chest x-ray showing findings that could represent infection.  Blood culture shows no growth.  Zosyn started.  Patient's acute agitation which occurred yesterday likely secondary to infection.  Patient has not required any additional doses of Ativan since yesterday morning.  Patient's clozapine was increased to 50 mg that she has been taking.    Patient is sedated when seen by writer.  Patient does not respond to name and verbal stimuli.  According to one-on-one sitter patient has been continuously asleep since she presented around afternoon time.  Patient is no longer in restraints.  Patient has not required any other emergency sedatives.    /72   Pulse (!) 104   Temp 97.7 °F (36.5 °C) (Axillary)   Resp 16   Ht 1.778 m (5' 10\")   Wt 70 kg (154 lb 5.2 oz)   SpO2 94%   BMI 22.14 kg/m²   Appetite:  [] Normal/Unchanged  [] Increased  [x] Decreased      Sleep:       [] Normal/Unchanged  [] Fair       [x] Poor              Energy:    [x] Normal/Unchanged  [] Increased  [] Decreased        Aggression:  [x] yes  [] no    Patient is [] able  [x] unable to CONTRACT FOR SAFETY ON THE UNIT    PAST MEDICAL/PSYCHIATRIC HISTORY:   Past Medical History:   Diagnosis Date    Adult failure to thrive     Agitation     Bradycardia     Dementia (HCC)     Falls     Gastritis     GERD (gastroesophageal reflux disease)     GI hemorrhage     Hyperlipidemia     Hypertension     Intellectual disability     Lack of coordination     Pneumonia     Restlessness     Schizophrenia  to acute metabolic encephalopathy  Dementia with behavioral disturbances   R/O acute opioid withdrawal  Principal Problem:    AMS (altered mental status)  Active Problems:    Intellectual disability    Acute delirium    Dementia (HCC)    Acute psychosis (HCC)    Palliative care encounter  Resolved Problems:    * No resolved hospital problems. *      LABS:    Recent Labs     01/07/25  1502   WBC 15.3*   HGB 14.9          Recent Labs     01/07/25  1502      K 4.2      CO2 23   BUN 17   CREATININE 1.1   GLUCOSE 187*       Recent Labs     01/07/25  1502   BILITOT 0.3   ALKPHOS 104   AST 23   ALT 18       No results found for: \"LABAMPH\", \"BARBSCNU\", \"LABBENZ\", \"CANNAB\", \"LABMETH\", \"PPXUR\", \"ETOH\"  Lab Results   Component Value Date/Time    TSH 5.68 01/02/2025 12:03 AM     No results found for: \"LITHIUM\"  No results found for: \"VALPROATE\", \"CBMZ\"    RISK ASSESSMENT: High     Treatment Plan:    Patient was given risperidone long-acting injection 1/03.   Still continues to receive clozapine 25 mg BiD to 50 mg BiD.  Consider IV Valproic Acid if agitation continues over next few days.     Plan will be discussed with attending, Dr. Juan MD.     PSYCHOTHERAPY/COUNSELING:  [] Therapeutic interview  [x] Supportive  [] CBT  [] Ongoing  [] Other                                       Héctor Tomas is a 59 y.o. male being evaluated face to face.     --Annie Olivia MD on 1/8/2025 at 1:11 PM    An electronic signature was used to authenticate this note.     **This report has been created using voice recognition software. It may contain minor errors which are inherent in voice recognition technology.**   I independently saw and evaluated the patient.  I reviewed the  documentation above.  Any additional comments or changes to the    documentation are stated below otherwise agree with assessment.      The patient was seen face-to-face.  He has been lethargic all day.  He spoke briefly and said he was okay and

## 2025-01-08 NOTE — PROGRESS NOTES
[facility]   OCCUPATIONAL THERAPY MISSED TREATMENT NOTE   INPATIENT   Date: 25  Patient Name: Héctor Tomas       Room:   MRN: [id number]   Account #: [id number]    : 1965  (59 y.o.)  Gender: male                 REASON FOR MISSED TREATMENT:  Patient unable to participate   -        Pt sedated and sleeping after being medicated for pain. Patient is  unable to participate in OT evaluation.  Case discussed w/ nurse Albina.  Will check status tomorrow. 14:30            Electronically signed by CLAY Roldan/L on 25 at 2:43 PM EST

## 2025-01-08 NOTE — PROGRESS NOTES
panel, start Z-Sergio for now  As per report patient was hospice on the other facility, not known why he was in hospice severe dementia?  Will reach out to facility,  Difficult discharge due to his mentation    1/7  No significant change, patient was getting very agitated today when I went to examine him was screaming, was getting tachycardic tachypneic, palliative notes reviewed patient was on morphine, not sure if he is having pain since very poor historian, will place order for IV morphine patient not taking anything p.o. currently,  Psych reconsulted, patient in restraints, tried to take it off last night with no help  Labs were ordered but could not get get it done since patient has been kicking and hitting  Overall prognosis is very poor, difficult discharge and placement      1/8  Patient seen and examined    Patient very drowsy today as per patient bedside nurse patient did took his medications and then fell back to sleep, responding to pain, open his eyes but not following any commands, he has been off restraints, no episode of agitation, seen by psychiatry, patient on Ativan started on clozapine this morning, was also started on morphine yesterday for tachypnea tachycardia, will DC morphine  Patient now getting septic, febrile leukocytosis tachycardia  Likely aspirating get chest x-ray start Zosyn get UA blood cultures ordered currently pending  Overall prognosis poor  As per report history of metastatic lung cancer but no records available  CODE STATUS is DNR CC    Consultations:   IP CONSULT TO PSYCHIATRY  IP CONSULT TO SOCIAL WORK  IP CONSULT TO NEUROLOGY  IP CONSULT TO PALLIATIVE CARE    Anna Aquino MD  1/8/2025  9:50 AM    Please note that this chart was generated using voice recognition Dragon dictation software.  Although every effort was made to ensure the accuracy of this automated transcription, some errors in transcription may have occurred.

## 2025-01-08 NOTE — PROGRESS NOTES
[facility] Internal Medicine  Mick Canchola MD; Buddy Corea MD; Lito Lopez MD; MD Chelita Archer MD; Santino Waldrop MD; Anna Aquino MD; Anai Nunez MD    [facility] Internal Medicine   IN-PATIENT SERVICE   [facility]    Progress Note            Date:   1/8/2025  Patient name:  Héctor Tomas  Date of admission:  1/1/2025 11:21 PM  MRN:   [id number]  Account:  [id number]  YOB: 1965  PCP:    Luciano Acosta MD  Room:   2037/2037-01  Code Status:    DNR-CC    Chief Complaint:     Chief Complaint   Patient presents with    Aggressive Behavior        History Obtained From:     patient, electronic medical record    History of Present Illness:     Héctor Tomas is a 59 y.o. Non- / non  male who presents with Aggressive Behavior   and is admitted to the hospital for the management of AMS (altered mental status).  Héctor Tomas is a 59 y.o. Non- / non  male who presents with Aggressive Behavior   And reports that patient is not acting himself.  He is admitted to the hospital for the management of AMS (altered mental status).     Patient's medical history significant for schizophrenia, mentally disabled, failure to thrive in adult, and hypertension.     At time of exam, patient is unable to provide any input into HPI.  When asked where we are, patient responded back asking, \"where are we?\"  Patient states that it is December but does not answer any further questions.     Telephone call placed to patient's ECF.  According to the nurse caring for the patient, patient has become extremely aggressive with staff over the past week.  Nurse reports that this is a new behavior for the patient, as he has never been aggressive with staff in the past.  Nurse reported that patient had been on hospice care for malignant neoplasm of the lung and that these services were recently discontinued in December;

## 2025-01-08 NOTE — PROGRESS NOTES
Physical Therapy        Physical Therapy Cancel Note      DATE: 2025    NAME: Héctor Tomas  MRN: [id number]   : 1965      Patient not seen this date for Physical Therapy due to:    2025 at [id number]:  HOLD PT evaluation.  Pt sedated and sleeping after being medicated for pain. Patient is  unable to participate in PT evaluation.  Case discussed w/ nurse Albina.  Will check status tomorrow.       Electronically signed by Hoda Hamlin PT on 2025 at 2:36 PM

## 2025-01-08 NOTE — PLAN OF CARE
Problem: Safety - Medical Restraint  Goal: Remains free of injury from restraints (Restraint for Interference with Medical Device)  Description: INTERVENTIONS:  1. Determine that other, less restrictive measures have been tried or would not be effective before applying the restraint  2. Evaluate the patient's condition at the time of restraint application  3. Inform patient/family regarding the reason for restraint  4. Q2H: Monitor safety, psychosocial status, comfort, nutrition and hydration  1/7/2025 2314 by Anamaria Jay RN  Outcome: Progressing  Flowsheets (Taken 1/7/2025 2100)  Remains free of injury from restraints (restraint for interference with medical device):   Determine that other, less restrictive measures have been tried or would not be effective before applying the restraint   Evaluate the patient's condition at the time of restraint application  1/7/2025 1643 by Amy Clifton RN  Outcome: Progressing     Problem: Discharge Planning  Goal: Discharge to home or other facility with appropriate resources  1/7/2025 2314 by Anamaria Jay RN  Outcome: Progressing  1/7/2025 1643 by Amy Clifton RN  Outcome: Progressing     Problem: Skin/Tissue Integrity  Goal: Absence of new skin breakdown  Description: 1.  Monitor for areas of redness and/or skin breakdown  2.  Assess vascular access sites hourly  3.  Every 4-6 hours minimum:  Change oxygen saturation probe site  4.  Every 4-6 hours:  If on nasal continuous positive airway pressure, respiratory therapy assess nares and determine need for appliance change or resting period.  1/7/2025 2314 by Anamaria Jay RN  Outcome: Progressing  1/7/2025 1643 by Amy Clifton RN  Outcome: Progressing     Problem: Safety - Adult  Goal: Free from fall injury  1/7/2025 2314 by Anamaria Jay RN  Outcome: Progressing  1/7/2025 1643 by Amy Clifton RN  Outcome: Progressing     Problem: Pain  Goal: Verbalizes/displays adequate comfort level

## 2025-01-08 NOTE — PLAN OF CARE
Problem: Safety - Medical Restraint  Goal: Remains free of injury from restraints (Restraint for Interference with Medical Device)  Description: INTERVENTIONS:  1. Determine that other, less restrictive measures have been tried or would not be effective before applying the restraint  2. Evaluate the patient's condition at the time of restraint application  3. Inform patient/family regarding the reason for restraint  4. Q2H: Monitor safety, psychosocial status, comfort, nutrition and hydration  Outcome: Progressing     Problem: Discharge Planning  Goal: Discharge to home or other facility with appropriate resources  Outcome: Progressing  Flowsheets (Taken 1/8/2025 6853)  Discharge to home or other facility with appropriate resources:   Identify barriers to discharge with patient and caregiver   Arrange for needed discharge resources and transportation as appropriate   Identify discharge learning needs (meds, wound care, etc)   Refer to discharge planning if patient needs post-hospital services based on physician order or complex needs related to functional status, cognitive ability or social support system     Problem: Skin/Tissue Integrity  Goal: Absence of new skin breakdown  Description: 1.  Monitor for areas of redness and/or skin breakdown  2.  Assess vascular access sites hourly  3.  Every 4-6 hours minimum:  Change oxygen saturation probe site  4.  Every 4-6 hours:  If on nasal continuous positive airway pressure, respiratory therapy assess nares and determine need for appliance change or resting period.  Outcome: Progressing     Problem: Safety - Adult  Goal: Free from fall injury  Outcome: Progressing  Flowsheets (Taken 1/8/2025 6380)  Free From Fall Injury: Instruct family/caregiver on patient safety     Problem: Pain  Goal: Verbalizes/displays adequate comfort level or baseline comfort level  Outcome: Progressing     Problem: ABCDS Injury Assessment  Goal: Absence of physical injury  Outcome:

## 2025-01-09 ENCOUNTER — APPOINTMENT (OUTPATIENT)
Dept: CT IMAGING | Age: 60
End: 2025-01-09
Payer: COMMERCIAL

## 2025-01-09 PROCEDURE — 6370000000 HC RX 637 (ALT 250 FOR IP): Performed by: NURSE PRACTITIONER

## 2025-01-09 PROCEDURE — 99233 SBSQ HOSP IP/OBS HIGH 50: CPT | Performed by: INTERNAL MEDICINE

## 2025-01-09 PROCEDURE — 1200000000 HC SEMI PRIVATE

## 2025-01-09 PROCEDURE — 99232 SBSQ HOSP IP/OBS MODERATE 35: CPT | Performed by: PSYCHIATRY & NEUROLOGY

## 2025-01-09 PROCEDURE — 6360000002 HC RX W HCPCS: Performed by: NURSE PRACTITIONER

## 2025-01-09 PROCEDURE — 6370000000 HC RX 637 (ALT 250 FOR IP): Performed by: PSYCHIATRY & NEUROLOGY

## 2025-01-09 PROCEDURE — 6360000004 HC RX CONTRAST MEDICATION: Performed by: INTERNAL MEDICINE

## 2025-01-09 PROCEDURE — 71260 CT THORAX DX C+: CPT

## 2025-01-09 PROCEDURE — 2500000003 HC RX 250 WO HCPCS: Performed by: INTERNAL MEDICINE

## 2025-01-09 PROCEDURE — 2580000003 HC RX 258: Performed by: INTERNAL MEDICINE

## 2025-01-09 PROCEDURE — 6360000002 HC RX W HCPCS: Performed by: INTERNAL MEDICINE

## 2025-01-09 RX ORDER — IOPAMIDOL 755 MG/ML
75 INJECTION, SOLUTION INTRAVASCULAR
Status: COMPLETED | OUTPATIENT
Start: 2025-01-09 | End: 2025-01-09

## 2025-01-09 RX ORDER — SODIUM CHLORIDE 0.9 % (FLUSH) 0.9 %
10 SYRINGE (ML) INJECTION PRN
Status: DISCONTINUED | OUTPATIENT
Start: 2025-01-09 | End: 2025-01-10 | Stop reason: HOSPADM

## 2025-01-09 RX ORDER — 0.9 % SODIUM CHLORIDE 0.9 %
100 INTRAVENOUS SOLUTION INTRAVENOUS ONCE
Status: COMPLETED | OUTPATIENT
Start: 2025-01-09 | End: 2025-01-09

## 2025-01-09 RX ORDER — ENOXAPARIN SODIUM 100 MG/ML
1 INJECTION SUBCUTANEOUS 2 TIMES DAILY
Status: DISCONTINUED | OUTPATIENT
Start: 2025-01-09 | End: 2025-01-10 | Stop reason: HOSPADM

## 2025-01-09 RX ADMIN — IOPAMIDOL 75 ML: 755 INJECTION, SOLUTION INTRAVENOUS at 16:48

## 2025-01-09 RX ADMIN — ENOXAPARIN SODIUM 70 MG: 100 INJECTION SUBCUTANEOUS at 18:14

## 2025-01-09 RX ADMIN — CLOZAPINE 50 MG: 25 TABLET ORAL at 20:19

## 2025-01-09 RX ADMIN — PIPERACILLIN AND TAZOBACTAM 3375 MG: 3; .375 INJECTION, POWDER, LYOPHILIZED, FOR SOLUTION INTRAVENOUS at 17:43

## 2025-01-09 RX ADMIN — PIPERACILLIN AND TAZOBACTAM 3375 MG: 3; .375 INJECTION, POWDER, LYOPHILIZED, FOR SOLUTION INTRAVENOUS at 02:29

## 2025-01-09 RX ADMIN — POLYETHYLENE GLYCOL 3350 17 G: 17 POWDER, FOR SOLUTION ORAL at 19:12

## 2025-01-09 RX ADMIN — CLOZAPINE 50 MG: 25 TABLET ORAL at 07:55

## 2025-01-09 RX ADMIN — SODIUM CHLORIDE 100 ML: 9 INJECTION, SOLUTION INTRAVENOUS at 16:49

## 2025-01-09 RX ADMIN — PIPERACILLIN AND TAZOBACTAM 3375 MG: 3; .375 INJECTION, POWDER, LYOPHILIZED, FOR SOLUTION INTRAVENOUS at 10:58

## 2025-01-09 RX ADMIN — SODIUM CHLORIDE, PRESERVATIVE FREE 10 ML: 5 INJECTION INTRAVENOUS at 16:48

## 2025-01-09 NOTE — PLAN OF CARE
Problem: Safety - Medical Restraint  Goal: Remains free of injury from restraints (Restraint for Interference with Medical Device)  Description: INTERVENTIONS:  1. Determine that other, less restrictive measures have been tried or would not be effective before applying the restraint  2. Evaluate the patient's condition at the time of restraint application  3. Inform patient/family regarding the reason for restraint  4. Q2H: Monitor safety, psychosocial status, comfort, nutrition and hydration  Outcome: Progressing  Flowsheets (Taken 1/9/2025 0439)  Remains free of injury from restraints (restraint for interference with medical device): Determine that other, less restrictive measures have been tried or would not be effective before applying the restraint     Problem: Discharge Planning  Goal: Discharge to home or other facility with appropriate resources  Outcome: Progressing  Flowsheets (Taken 1/9/2025 0439)  Discharge to home or other facility with appropriate resources:   Identify barriers to discharge with patient and caregiver   Arrange for needed discharge resources and transportation as appropriate   Identify discharge learning needs (meds, wound care, etc)   Arrange for interpreters to assist at discharge as needed   Refer to discharge planning if patient needs post-hospital services based on physician order or complex needs related to functional status, cognitive ability or social support system     Problem: Skin/Tissue Integrity  Goal: Absence of new skin breakdown  Description: 1.  Monitor for areas of redness and/or skin breakdown  2.  Assess vascular access sites hourly  3.  Every 4-6 hours minimum:  Change oxygen saturation probe site  4.  Every 4-6 hours:  If on nasal continuous positive airway pressure, respiratory therapy assess nares and determine need for appliance change or resting period.  Outcome: Progressing  Note: Monitoring skin     Problem: Safety - Adult  Goal: Free from fall  injury  Outcome: Progressing  Flowsheets (Taken 1/9/2025 0439)  Free From Fall Injury: Instruct family/caregiver on patient safety     Problem: Pain  Goal: Verbalizes/displays adequate comfort level or baseline comfort level  Outcome: Progressing  Flowsheets (Taken 1/9/2025 0439)  Verbalizes/displays adequate comfort level or baseline comfort level:   Encourage patient to monitor pain and request assistance   Assess pain using appropriate pain scale   Administer analgesics based on type and severity of pain and evaluate response   Implement non-pharmacological measures as appropriate and evaluate response   Consider cultural and social influences on pain and pain management   Notify Licensed Independent Practitioner if interventions unsuccessful or patient reports new pain     Problem: ABCDS Injury Assessment  Goal: Absence of physical injury  Outcome: Progressing  Flowsheets (Taken 1/9/2025 0439)  Absence of Physical Injury: Implement safety measures based on patient assessment

## 2025-01-09 NOTE — PROGRESS NOTES
[facility] Internal Medicine  Mick Canchola MD; Buddy Corea MD; Lito Lopez MD; MD Chelita Archer MD; Santino Waldrop MD; Anna Aquino MD; Anai Nunez MD    [facility] Internal Medicine   IN-PATIENT SERVICE   [facility]    Progress Note            Date:   1/9/2025  Patient name:  Héctor Tomas  Date of admission:  1/1/2025 11:21 PM  MRN:   [id number]  Account:  [id number]  YOB: 1965  PCP:    Luciano Acosta MD  Room:   2037/2037-01  Code Status:    DNR-CC    Chief Complaint:     Chief Complaint   Patient presents with    Aggressive Behavior        History Obtained From:     patient, electronic medical record    History of Present Illness:     Héctor Tomas is a 59 y.o. Non- / non  male who presents with Aggressive Behavior   and is admitted to the hospital for the management of AMS (altered mental status).  Héctor Tomas is a 59 y.o. Non- / non  male who presents with Aggressive Behavior   And reports that patient is not acting himself.  He is admitted to the hospital for the management of AMS (altered mental status).     Patient's medical history significant for schizophrenia, mentally disabled, failure to thrive in adult, and hypertension.     At time of exam, patient is unable to provide any input into HPI.  When asked where we are, patient responded back asking, \"where are we?\"  Patient states that it is December but does not answer any further questions.     Telephone call placed to patient's ECF.  According to the nurse caring for the patient, patient has become extremely aggressive with staff over the past week.  Nurse reports that this is a new behavior for the patient, as he has never been aggressive with staff in the past.  Nurse reported that patient had been on hospice care for malignant neoplasm of the lung and that these services were recently discontinued in December;  reports that he does not currently use alcohol.  Drug Use:  has no history on file for drug use.    Family History:     Family History   Family history unknown: Yes       Review of Systems:     Positive and Negative as described in HPI.    Review of Systems   Unable to perform ROS: Mental status change   Patient is agitated in restraints.    Physical Exam:   BP (!) 142/81   Pulse (!) 112   Temp 97.9 °F (36.6 °C) (Axillary)   Resp 20   Ht 1.778 m (5' 10\")   Wt 70 kg (154 lb 5.2 oz)   SpO2 91%   BMI 22.14 kg/m²   Temp (24hrs), Av.8 °F (36.6 °C), Min:97.7 °F (36.5 °C), Max:97.9 °F (36.6 °C)    No results for input(s): \"POCGLU\" in the last 72 hours.    Intake/Output Summary (Last 24 hours) at 2025 1112  Last data filed at 2025 0612  Gross per 24 hour   Intake --   Output 600 ml   Net -600 ml           Physical Exam  Vitals reviewed.   Constitutional:       General: He is not in acute distress.     Appearance: He is not ill-appearing.   HENT:      Head: Normocephalic.   Eyes:      General: Lids are normal.   Cardiovascular:      Rate and Rhythm: Normal rate.   Pulmonary:      Effort: No accessory muscle usage.   Musculoskeletal:      Right lower leg: No swelling.      Left lower leg: No swelling.   Neurological:      Mental Status: He is alert. He is disoriented and confused.   Psychiatric:         Behavior: Behavior is uncooperative and agitated.         Investigations:      Laboratory Testing:  Recent Results (from the past 24 hour(s))   Urinalysis with Reflex to Culture    Collection Time: 25  3:29 PM    Specimen: Urine   Result Value Ref Range    Color, UA Yellow Yellow    Turbidity UA Clear Clear    Glucose, Ur NEGATIVE NEGATIVE mg/dL    Bilirubin, Urine NEGATIVE NEGATIVE    Ketones, Urine NEGATIVE NEGATIVE mg/dL    Specific Gravity, UA 1.021 1.000 - 1.030    Urine Hgb NEGATIVE NEGATIVE    pH, Urine 6.0 5.0 - 8.0    Protein, UA NEGATIVE NEGATIVE mg/dL    Urobilinogen, Urine Normal 0.0 - 1.0

## 2025-01-09 NOTE — PROGRESS NOTES
Housing Stability: Not on file           ROS:  [x] All negative/unchanged except if checked. Explain positive(checked items) below:  [] Constitutional  [] Eyes  [] Ear/Nose/Mouth/Throat  [] Respiratory  [] CV  [] GI  []   [] Musculoskeletal  [] Skin/Breast  [] Neurological  [] Endocrine  [] Heme/Lymph  [] Allergic/Immunologic    Explanation:     MEDICATIONS:    Current Facility-Administered Medications:     morphine injection 1 mg, 1 mg, IntraVENous, Q4H PRN, Anna Aquino MD    piperacillin-tazobactam (ZOSYN) 3,375 mg in sodium chloride 0.9 % 50 mL IVPB (mini-bag), 3,375 mg, IntraVENous, Q8H, Anna Aquino MD, Stopped at 01/09/25 0629    cloZAPine (CLOZARIL) tablet 50 mg, 50 mg, Oral, BID, Nhan Martinez MD, 50 mg at 01/09/25 0755    LORazepam (ATIVAN) injection 1 mg, 1 mg, IntraVENous, Q4H PRN, Anai Nunez MD, 1 mg at 01/07/25 0945    risperiDONE ER (UZEDY) subcutaneous injection 125 mg, 125 mg, SubCUTAneous, Q30 Days, Nhan Martinez MD, 125 mg at 01/03/25 1934    sodium chloride flush 0.9 % injection 5-40 mL, 5-40 mL, IntraVENous, 2 times per day, Sue Eubanks APRN - CNP, 10 mL at 01/08/25 0927    sodium chloride flush 0.9 % injection 10 mL, 10 mL, IntraVENous, PRN, Sue Eubanks, APRN - CNP    0.9 % sodium chloride infusion, , IntraVENous, PRN, Sue Eubanks, APRN - CNP, Last Rate: 10 mL/hr at 01/08/25 1158, New Bag at 01/08/25 1158    potassium chloride (KLOR-CON M) extended release tablet 40 mEq, 40 mEq, Oral, PRN **OR** potassium bicarb-citric acid (EFFER-K) effervescent tablet 40 mEq, 40 mEq, Oral, PRN **OR** potassium chloride 10 mEq/100 mL IVPB (Peripheral Line), 10 mEq, IntraVENous, PRN, Sue Eubanks, APRN - CNP    magnesium sulfate 2000 mg in water 50 mL IVPB, 2,000 mg, IntraVENous, PRN, Sue Eubanks, APRN - CNP    enoxaparin (LOVENOX) injection 40 mg, 40 mg, SubCUTAneous, Daily, Sue Eubanks APRN - CNP, 40 mg at 01/07/25 0945    ondansetron (ZOFRAN-ODT) disintegrating tablet 4  conducted.  Follow-up daily while on inpatient unit    Electronically signed by JACKELIN SIMMONS MD on 1/9/25 at 10:11 PM EST

## 2025-01-09 NOTE — PLAN OF CARE
Problem: Safety - Medical Restraint  Goal: Remains free of injury from restraints (Restraint for Interference with Medical Device)  Description: INTERVENTIONS:  1. Determine that other, less restrictive measures have been tried or would not be effective before applying the restraint  2. Evaluate the patient's condition at the time of restraint application  3. Inform patient/family regarding the reason for restraint  4. Q2H: Monitor safety, psychosocial status, comfort, nutrition and hydration  1/9/2025 1548 by Albina Cardoso RN  Outcome: Progressing  1/9/2025 0439 by Iliana Portillo RN  Outcome: Progressing  Flowsheets (Taken 1/9/2025 0439)  Remains free of injury from restraints (restraint for interference with medical device): Determine that other, less restrictive measures have been tried or would not be effective before applying the restraint     Problem: Discharge Planning  Goal: Discharge to home or other facility with appropriate resources  1/9/2025 1548 by Albina Cardoso RN  Outcome: Progressing  1/9/2025 0439 by Iliana Portillo RN  Outcome: Progressing  Flowsheets (Taken 1/9/2025 0439)  Discharge to home or other facility with appropriate resources:   Identify barriers to discharge with patient and caregiver   Arrange for needed discharge resources and transportation as appropriate   Identify discharge learning needs (meds, wound care, etc)   Arrange for interpreters to assist at discharge as needed   Refer to discharge planning if patient needs post-hospital services based on physician order or complex needs related to functional status, cognitive ability or social support system     Problem: Skin/Tissue Integrity  Goal: Absence of new skin breakdown  Description: 1.  Monitor for areas of redness and/or skin breakdown  2.  Assess vascular access sites hourly  3.  Every 4-6 hours minimum:  Change oxygen saturation probe site  4.  Every 4-6 hours:  If on nasal continuous positive  airway pressure, respiratory therapy assess nares and determine need for appliance change or resting period.  1/9/2025 1548 by Albina Cardoso RN  Outcome: Progressing  1/9/2025 0439 by Iliana Portillo RN  Outcome: Progressing  Note: Monitoring skin     Problem: Safety - Adult  Goal: Free from fall injury  1/9/2025 1548 by Albina Cardoso RN  Outcome: Progressing  Flowsheets (Taken 1/9/2025 1546)  Free From Fall Injury: Instruct family/caregiver on patient safety  1/9/2025 0439 by Iliana Portillo RN  Outcome: Progressing  Flowsheets (Taken 1/9/2025 0439)  Free From Fall Injury: Instruct family/caregiver on patient safety     Problem: Pain  Goal: Verbalizes/displays adequate comfort level or baseline comfort level  1/9/2025 1548 by Albina Cardoso RN  Outcome: Progressing  1/9/2025 0439 by Iliana Portillo RN  Outcome: Progressing  Flowsheets (Taken 1/9/2025 0439)  Verbalizes/displays adequate comfort level or baseline comfort level:   Encourage patient to monitor pain and request assistance   Assess pain using appropriate pain scale   Administer analgesics based on type and severity of pain and evaluate response   Implement non-pharmacological measures as appropriate and evaluate response   Consider cultural and social influences on pain and pain management   Notify Licensed Independent Practitioner if interventions unsuccessful or patient reports new pain     Problem: ABCDS Injury Assessment  Goal: Absence of physical injury  1/9/2025 1548 by Albina Cardoso RN  Outcome: Progressing  Flowsheets (Taken 1/9/2025 1546)  Absence of Physical Injury: Implement safety measures based on patient assessment  1/9/2025 0439 by Iliana Portillo RN  Outcome: Progressing  Flowsheets (Taken 1/9/2025 0439)  Absence of Physical Injury: Implement safety measures based on patient assessment

## 2025-01-09 NOTE — CARE COORDINATION
DISCHARGE PLANNING NOTE:    LSW following for discharge back to SNF. Patient is from [facility] Sitter removed at 1140 on 1/9    Will need to be sitter free for 24 hours prior to returning to facility.

## 2025-01-09 NOTE — PROGRESS NOTES
[facility]   OCCUPATIONAL THERAPY MISSED TREATMENT NOTE   INPATIENT   Date: 25  Patient Name: Héctor Tomas       Room:   MRN: [id number]   Account #: [id number]    : 1965  (59 y.o.)  Gender: male       REASON FOR MISSED TREATMENT:  Hold treatment per nursing request   -    1435      Electronically signed by MARGAUX Dyson on 25 at 3:01 PM EST

## 2025-01-09 NOTE — PROGRESS NOTES
Received phone call from [facility]. CTA chest shows small bilateral pulmonary embolism. Updated [facility] increased to therapeutic treatment dose.

## 2025-01-10 ENCOUNTER — HOSPITAL ENCOUNTER (INPATIENT)
Dept: VASCULAR LAB | Age: 60
Discharge: HOME | End: 2025-01-12
Attending: INTERNAL MEDICINE
Payer: COMMERCIAL

## 2025-01-10 ENCOUNTER — APPOINTMENT (OUTPATIENT)
Age: 60
End: 2025-01-10
Attending: INTERNAL MEDICINE
Payer: COMMERCIAL

## 2025-01-10 VITALS
RESPIRATION RATE: 17 BRPM | TEMPERATURE: 97.7 F | DIASTOLIC BLOOD PRESSURE: 85 MMHG | SYSTOLIC BLOOD PRESSURE: 141 MMHG | HEART RATE: 106 BPM | HEIGHT: 70 IN | WEIGHT: 154 LBS | BODY MASS INDEX: 22.05 KG/M2 | OXYGEN SATURATION: 94 %

## 2025-01-10 PROBLEM — I26.99 PULMONARY EMBOLUS (HCC): Status: ACTIVE | Noted: 2025-01-10

## 2025-01-10 LAB
BASOPHILS # BLD: 0.1 K/UL (ref 0–0.2)
BASOPHILS NFR BLD: 1 % (ref 0–2)
EOSINOPHIL # BLD: 0.2 K/UL (ref 0–0.4)
EOSINOPHILS RELATIVE PERCENT: 3 % (ref 0–4)
ERYTHROCYTE [DISTWIDTH] IN BLOOD BY AUTOMATED COUNT: 13.3 % (ref 11.5–14.9)
HCT VFR BLD AUTO: 37.6 % (ref 41–53)
HGB BLD-MCNC: 12.8 G/DL (ref 13.5–17.5)
LYMPHOCYTES NFR BLD: 1.2 K/UL (ref 1–4.8)
LYMPHOCYTES RELATIVE PERCENT: 19 % (ref 24–44)
MCH RBC QN AUTO: 29.7 PG (ref 26–34)
MCHC RBC AUTO-ENTMCNC: 34.1 G/DL (ref 31–37)
MCV RBC AUTO: 87.2 FL (ref 80–100)
MONOCYTES NFR BLD: 0.7 K/UL (ref 0.1–1.3)
MONOCYTES NFR BLD: 11 % (ref 1–7)
NEUTROPHILS NFR BLD: 66 % (ref 36–66)
NEUTS SEG NFR BLD: 4.2 K/UL (ref 1.3–9.1)
PLATELET # BLD AUTO: 258 K/UL (ref 150–450)
PMV BLD AUTO: 8.2 FL (ref 6–12)
RBC # BLD AUTO: 4.31 M/UL (ref 4.5–5.9)
WBC OTHER # BLD: 6.3 K/UL (ref 3.5–11)

## 2025-01-10 PROCEDURE — 6360000002 HC RX W HCPCS: Performed by: NURSE PRACTITIONER

## 2025-01-10 PROCEDURE — 36415 COLL VENOUS BLD VENIPUNCTURE: CPT

## 2025-01-10 PROCEDURE — 6360000004 HC RX CONTRAST MEDICATION: Performed by: INTERNAL MEDICINE

## 2025-01-10 PROCEDURE — 93306 TTE W/DOPPLER COMPLETE: CPT | Performed by: INTERNAL MEDICINE

## 2025-01-10 PROCEDURE — C8929 TTE W OR WO FOL WCON,DOPPLER: HCPCS

## 2025-01-10 PROCEDURE — 99232 SBSQ HOSP IP/OBS MODERATE 35: CPT | Performed by: PSYCHIATRY & NEUROLOGY

## 2025-01-10 PROCEDURE — 99223 1ST HOSP IP/OBS HIGH 75: CPT | Performed by: INTERNAL MEDICINE

## 2025-01-10 PROCEDURE — 6360000002 HC RX W HCPCS: Performed by: INTERNAL MEDICINE

## 2025-01-10 PROCEDURE — 85025 COMPLETE CBC W/AUTO DIFF WBC: CPT

## 2025-01-10 PROCEDURE — 99239 HOSP IP/OBS DSCHRG MGMT >30: CPT | Performed by: INTERNAL MEDICINE

## 2025-01-10 PROCEDURE — 6370000000 HC RX 637 (ALT 250 FOR IP): Performed by: PSYCHIATRY & NEUROLOGY

## 2025-01-10 PROCEDURE — 2580000003 HC RX 258: Performed by: INTERNAL MEDICINE

## 2025-01-10 RX ORDER — CLOZAPINE 50 MG/1
50 TABLET ORAL 2 TIMES DAILY
Qty: 30 TABLET | Refills: 3 | Status: SHIPPED | OUTPATIENT
Start: 2025-01-10

## 2025-01-10 RX ORDER — PANTOPRAZOLE SODIUM 40 MG/1
40 TABLET, DELAYED RELEASE ORAL
Qty: 90 TABLET | Refills: 1 | Status: SHIPPED | OUTPATIENT
Start: 2025-01-10

## 2025-01-10 RX ADMIN — PERFLUTREN 3 ML: 6.52 INJECTION, SUSPENSION INTRAVENOUS at 16:11

## 2025-01-10 RX ADMIN — PIPERACILLIN AND TAZOBACTAM 3375 MG: 3; .375 INJECTION, POWDER, LYOPHILIZED, FOR SOLUTION INTRAVENOUS at 09:37

## 2025-01-10 RX ADMIN — PANTOPRAZOLE SODIUM 40 MG: 40 INJECTION, POWDER, LYOPHILIZED, FOR SOLUTION INTRAVENOUS at 10:33

## 2025-01-10 RX ADMIN — CLOZAPINE 50 MG: 25 TABLET ORAL at 08:25

## 2025-01-10 RX ADMIN — PIPERACILLIN AND TAZOBACTAM 3375 MG: 3; .375 INJECTION, POWDER, LYOPHILIZED, FOR SOLUTION INTRAVENOUS at 15:01

## 2025-01-10 RX ADMIN — PIPERACILLIN AND TAZOBACTAM 3375 MG: 3; .375 INJECTION, POWDER, LYOPHILIZED, FOR SOLUTION INTRAVENOUS at 02:02

## 2025-01-10 RX ADMIN — ENOXAPARIN SODIUM 70 MG: 100 INJECTION SUBCUTANEOUS at 08:31

## 2025-01-10 NOTE — CARE COORDINATION
Continuity of Care Form    Patient Name: Héctor Tomas   :  1965  MRN:  [id number]    Admit date:  2025  Discharge date:  1/10/2025    Code Status Order: DNR-CC   Advance Directives:   Advance Care Flowsheet Documentation             Admitting Physician:  Anna Aquino MD  PCP: Luciano Acosta MD    Discharging Nurse: Albina Cardoso RN  Discharging Hospital Unit/Room#: 7/-01  Discharging Unit Phone Number: [phone]    Emergency Contact:   Extended Emergency Contact Information  Primary Emergency Contact: Rudolph Laura  Home Phone: [phone]  Relation: Other   needed? No  Secondary Emergency Contact: Compa Cai  Home Phone: [phone]  Relation: Other   needed? No    Past Surgical History:  Past Surgical History:   Procedure Laterality Date    COLONOSCOPY N/A 2021    COLORECTAL CANCER SCREENING TO HEPATIC FLEXURE performed by David Alcazar MD at [facility] OR    ENDOSCOPY, COLON, DIAGNOSTIC  2021    EGD BIOPSY    UPPER GASTROINTESTINAL ENDOSCOPY N/A 2021    EGD BIOPSY SMALL BOWEL AND STOMACH AND GE JUNCTION performed by David Alcazar MD at [facility] OR       Immunization History:   Immunization History   Administered Date(s) Administered    COVID-19, J&J, (age 18y+), IM, 0.5 mL 2021    Pneumococcal, PPSV23, PNEUMOVAX 23, (age 2y+), SC/IM, 0.5mL 2016       Active Problems:  Patient Active Problem List   Diagnosis Code    Constipation K59.00    Esophagitis K20.90    GI bleed K92.2    Intellectual disability F79    Other schizophrenia (HCC) F20.89    Retention of urine R33.9    FTT (failure to thrive) in adult R62.7    Frequent falls R29.6    AMS (altered mental status) R41.82    Acute delirium R41.0    Dementia (HCC) F03.90    Acute psychosis (HCC) F23       Isolation/Infection:   Isolation            No Isolation          Patient Infection Status       None to display            Nurse Assessment:  Last Vital Signs: /73

## 2025-01-10 NOTE — PLAN OF CARE
Problem: Discharge Planning  Goal: Discharge to home or other facility with appropriate resources  1/10/2025 0103 by Marcos Vaughan, RN  Outcome: Progressing  Flowsheets (Taken 1/9/2025 1956)  Discharge to home or other facility with appropriate resources: Arrange for needed discharge resources and transportation as appropriate     Problem: Skin/Tissue Integrity  Goal: Absence of new skin breakdown  Description: 1.  Monitor for areas of redness and/or skin breakdown  2.  Assess vascular access sites hourly  3.  Every 4-6 hours minimum:  Change oxygen saturation probe site  4.  Every 4-6 hours:  If on nasal continuous positive airway pressure, respiratory therapy assess nares and determine need for appliance change or resting period.  1/10/2025 0103 by Marcos Vaughan, RN  Outcome: Progressing     Problem: Safety - Adult  Goal: Free from fall injury  1/10/2025 0103 by Marcos Vaughan, RN  Outcome: Progressing

## 2025-01-10 NOTE — PROGRESS NOTES
extended release tablet 40 mEq, 40 mEq, Oral, PRN **OR** potassium bicarb-citric acid (EFFER-K) effervescent tablet 40 mEq, 40 mEq, Oral, PRN **OR** potassium chloride 10 mEq/100 mL IVPB (Peripheral Line), 10 mEq, IntraVENous, PRN, Sue Eubanks APRN - CNP    magnesium sulfate 2000 mg in water 50 mL IVPB, 2,000 mg, IntraVENous, PRN, Sue Eubanks APRN - CNP    ondansetron (ZOFRAN-ODT) disintegrating tablet 4 mg, 4 mg, Oral, Q8H PRN **OR** ondansetron (ZOFRAN) injection 4 mg, 4 mg, IntraVENous, Q6H PRN, Sue Eubanks APRN - CNP    polyethylene glycol (GLYCOLAX) packet 17 g, 17 g, Oral, Daily PRN, Sue Eubanks APRN - CNP, 17 g at 01/09/25 1912    bisacodyl (DULCOLAX) suppository 10 mg, 10 mg, Rectal, Daily PRN, Sue Eubanks APRN - CNP    acetaminophen (TYLENOL) tablet 650 mg, 650 mg, Oral, Q6H PRN, 650 mg at 01/05/25 2100 **OR** acetaminophen (TYLENOL) suppository 650 mg, 650 mg, Rectal, Q6H PRN, Sue Eubanks APRN - CNP, 650 mg at 01/07/25 1817    hyoscyamine (LEVSIN/SL) sublingual tablet 0.125 mg, 0.125 mg, SubLINGual, Q4H PRN, Sue Eubanks APRN - CNP    sennosides-docusate sodium (SENOKOT-S) 8.6-50 MG tablet 1 tablet, 1 tablet, Oral, Q12H PRN, Sue Eubanks APRN - CNP      Examination:  BP (!) 135/97   Pulse (!) 104   Temp 97.7 °F (36.5 °C) (Oral)   Resp 17   Ht 1.778 m (5' 10\")   Wt 70 kg (154 lb 5.2 oz)   SpO2 95%   BMI 22.14 kg/m²   Gait - steady  Medication side effects(SE):     Mental Status Examination:    Level of consciousness:  within normal limits   Appearance:  poor grooming and poor hygiene  Behavior/Motor:  agitated, pulling at lines, gown, etc., combative, and physically aggressive  Attitude toward examiner:  argumentative and hostile  Speech:  loud and hyperverbal   Mood: angry and irritable  Affect:  angry  Thought processes:  rapid   Thought content:  Homicidal ideation - none  Suicidal Ideation:  Did not answer   Delusions:  paranoid  Perceptual Disturbance:  Does not respond  otherwise agree with assessment.        The patient continues to have improved behavioral control.  He tolerated an echo cardiogram today.  The patient has poverty of thought and poverty of speech.  He is oriented to place person and situation.  He acknowledged having some auditory hallucinations.  No changes have been made to his medication today  PLAN  Medications as noted above  Attempt to develop insight  Psycho-education conducted.  Supportive Therapy conducted.  Follow-up daily while on inpatient unit    Electronically signed by JACKELIN SIMMONS MD on 1/10/25 at 8:11 PM EST

## 2025-01-10 NOTE — PROGRESS NOTES
Attempted Venous Doppler. Pt refused multiple times, even after encouragement. JEAN PIERRE Montemayor present.

## 2025-01-10 NOTE — CONSULTS
Today's Date: 1/10/2025  Patient Name: Héctor Tomas  Date of admission: 1/1/2025 11:21 PM  Patient's age: 59 y.o., 1965  Admission Dx: Altered mental status, unspecified altered mental status type [R41.82]  AMS (altered mental status) [R41.82]    Reason for Consult: management recommendations  Requesting Physician: Anna Aquino MD    CHIEF COMPLAINT: Pulmonary embolism    History Obtained From:  electronic medical record, medical team    HISTORY OF PRESENT ILLNESS:      The patient is a 59 y.o.  male who is admitted to the hospital for aggression and altered mental status.  Patient has multiple medical problems including MRDD schizophrenia failure to thrive hypertension nursing home resident admitted to [facility] with complains of increased aggression and altered mental status.  Per records patient has been diagnosed with lung cancer and was enrolled in hospice however hospice, discontinued in December 2024.  Patient has a legal guardian who is patient's power of .  Patient CODE STATUS is DNR CC.  Patient admitted for further workup and evaluation of care altered mental status  Workup done including a CTA shows bilateral PE.  Patient started on therapeutic dose Lovenox.  Lower extremity Doppler is pending  CT chest done shows small volume pulmonary embolism.  Sclerosis involving posterior thoracic spine vertebral bodies.  No obvious malignancy noted on the scan      Past Medical History:   has a past medical history of Adult failure to thrive, Agitation, Bradycardia, Dementia (HCC), Falls, Gastritis, GERD (gastroesophageal reflux disease), GI hemorrhage, Hyperlipidemia, Hypertension, Intellectual disability, Lack of coordination, Pneumonia, Restlessness, Schizophrenia with prominent negative symptoms (HCC), and Unsteady.    Past Surgical History:   has a past surgical history that includes Endoscopy, colon, diagnostic (07/19/2021); Upper gastrointestinal endoscopy (N/A,  based adjustment of the mA/kV was utilized to reduce the radiation dose to as low as reasonably achievable. COMPARISON: None. HISTORY: ORDERING SYSTEM PROVIDED HISTORY: r/o pe TECHNOLOGIST PROVIDED HISTORY: r/o pe Additional Contrast?->1 Reason for Exam: r/o pe FINDINGS: Pulmonary Arteries: Pulmonary arteries are adequately opacified for evaluation.  Small volume pulmonary embolus involving the posteromedial right lower lobe (series 8, images 156 through 161).  Possible small volume inferior left upper lobe pulmonary embolus (series 8, images 107 through 118).  Main pulmonary artery is normal in caliber. Mediastinum: No axillary adenopathy.  Calcified mediastinal lymph nodes.  No mediastinal adenopathy.  Tiny 5 mm nodule involving the posterior left lobe of the thyroid. Great vessels are normal in position and size.  Cardiac chambers unremarkable in appearance.  No pericardial effusion or pericardial thickening. Lungs/pleura: Atelectasis involving the lower lobes.  Mild scarring in the lung apices.  Calcified granuloma in the right upper lobe.  No pneumothorax. No pleural effusion.  No suspicious pulmonary nodularity.  Small volume secretions in the right trachea.  Central airways are otherwise patent. Upper Abdomen: Small cyst or hemangioma in the left lobe of the liver.  Mild mural thickening of the distal esophagus.  Remainder of the imaged upper abdominal organs are unremarkable in appearance. Soft Tissues/Bones: Spondylosis of the thoracic spine.  Sclerosis adjacent to the Jose's plexus involving the posterior aspect of the vertebral bodies at the T2 through the T5 vertebral bodies may be inflammatory in nature.  Stable mild compression of the L1 vertebral body.  No acute osseous abnormality identified. Bilateral gynecomastia.     Small volume pulmonary embolus involving the right lower lobe. Possible small volume pulmonary embolus involving the left upper lobe. No evidence of right heart strain. Atelectasis

## 2025-01-10 NOTE — PROGRESS NOTES
[facility]   OCCUPATIONAL THERAPY MISSED TREATMENT NOTE   INPATIENT   Date: 1/10/25  Patient Name: Héctor Tomas       Room:   MRN: [id number]   Account #: [id number]    : 1965  (59 y.o.)  Gender: male                 REASON FOR MISSED TREATMENT:  Patient refused   -   Writer introduced self and role. Patient minimally responding and answering therapists questions. OT will continue to follow and check back as time permits.     Electronically signed by MARGAUX Laughlin on 1/10/25 at 2:04 PM EST

## 2025-01-10 NOTE — PROGRESS NOTES
[facility] Internal Medicine  Mick Canchola MD; Buddy Corea MD; Lito Lopez MD; MD Chelita Archer MD; Santino Waldrop MD; Anna Aquino MD; Anai Nunez MD    [facility] Internal Medicine   IN-PATIENT SERVICE   [facility]    Progress Note            Date:   1/10/2025  Patient name:  Héctor Tomas  Date of admission:  1/1/2025 11:21 PM  MRN:   [id number]  Account:  [id number]  YOB: 1965  PCP:    Luciano Acosta MD  Room:   2040/2040-01  Code Status:    DNR-CC    Chief Complaint:     Chief Complaint   Patient presents with    Aggressive Behavior        History Obtained From:     patient, electronic medical record    History of Present Illness:     Héctor Tomas is a 59 y.o. Non- / non  male who presents with Aggressive Behavior   and is admitted to the hospital for the management of AMS (altered mental status).  Héctor Tomas is a 59 y.o. Non- / non  male who presents with Aggressive Behavior   And reports that patient is not acting himself.  He is admitted to the hospital for the management of AMS (altered mental status).     Patient's medical history significant for schizophrenia, mentally disabled, failure to thrive in adult, and hypertension.     At time of exam, patient is unable to provide any input into HPI.  When asked where we are, patient responded back asking, \"where are we?\"  Patient states that it is December but does not answer any further questions.     Telephone call placed to patient's ECF.  According to the nurse caring for the patient, patient has become extremely aggressive with staff over the past week.  Nurse reports that this is a new behavior for the patient, as he has never been aggressive with staff in the past.  Nurse reported that patient had been on hospice care for malignant neoplasm of the lung and that these services were recently discontinued in December;  aspiration pneumonia started on Zosyn  Cultures pending  Labs were already ordered, not sure if patient has refused labs in the morning  Will also check stat D-dimer to rule out PE,  If PE D-dimer positive will do CTA  Continue Zosyn  aFebrile since last 2 days next if  Seen by psychiatry medication adjusted  CODE STATUS DNR CC    1/10  Patient seen and examined  Had CTA yesterday showed to have bilateral PE, switch to full dose Lovenox pulm and oncology consulted get ultrasound Doppler lower extremity  Will need at least 6 months of anticoagulation  Patient has been off sitter and off restraints, much more calm and cooperative today  Tachycardia getting better   Discharge back to facility once cleared by pulm and oncology  Will need to go on p.o. anticoagulation  DNR CC  Continue Zosyn, will switch to p.o. antibiotics at the time of discharge:  CT also showed terminal esophagitis, will start PPI  Consultations:   IP CONSULT TO PSYCHIATRY  IP CONSULT TO SOCIAL WORK  IP CONSULT TO NEUROLOGY  IP CONSULT TO PALLIATIVE CARE  IP CONSULT TO ONCOLOGY  IP CONSULT TO PULMONOLOGY    Anna Aquino MD  1/10/2025  10:07 AM    Please note that this chart was generated using voice recognition Dragon dictation software.  Although every effort was made to ensure the accuracy of this automated transcription, some errors in transcription may have occurred.

## 2025-01-10 NOTE — CARE COORDINATION
Transportation arranged for patient to go to [facility] at 2000 via [facility] by stretcher. Facility informed. Bedside nurse informed.     Number for Report: [phone]  Electronically signed by HANNA Trevizo on 1/10/2025 at 3:50 PM

## 2025-01-10 NOTE — CARE COORDINATION
IMM letter provided to patient guardian  Rudolph Laura.  Patient representative offered four hours to make informed decision regarding appeal process; patient representative agreeable to discharge.   Electronically signed by HANNA Trevizo on 1/10/2025 at 11:10 AM

## 2025-01-10 NOTE — PROGRESS NOTES
Patients IV removed without complication. Hand off given to Deisy at [facility]. All questions answered at this time.

## 2025-01-10 NOTE — PROGRESS NOTES
Physical Therapy        Physical Therapy Cancel Note      DATE: 1/10/2025    NAME: Héctor Tomas  MRN: [id number]   : 1965      Patient not seen this date for Physical Therapy due to:    Patient refusal; PT/OT Writer introduced self and role. Patient minimally responding and answering therapists questions, pt declines by either shaking head no or verbally stating, \"No\" to participate in any bed mobility or out of bed mobility this date. PT will continue to follow and re-attempt as able.       Electronically signed by Aaliyah Arango PT on 1/10/2025 at 4:33 PM

## 2025-01-10 NOTE — CARE COORDINATION
DISCHARGE PLANNING NOTE:    Writer is following for potential discharge to [facility] ([facility]) Sitter was removed @ 1140 1/9. Pt must be 24 hours sitter free prior to return to facility.     Call placed to pt legal guardian Rudolph to update on potential discharge. No further questions at this time.    Call placed to Briana with [facility] to confirm pt potential discharge. Briana states she is new and is unsure of what is needed for admission. Writer explained pt is long-term care and facility never stated an authorization was required for return, only that pt would have to be 24 hours sitter free. Briana will place call back to charlier after verification.   Electronically signed by HANNA Trevizo on 1/10/2025 at 10:42 AM    Call received from Briana, facility accepts this pt no authorization required if able to discharge. Perfect[employer] message placed to bedside JEAN PIERRE Montemayor requesting update if pt is able to discharge after clearance by physicians.   Electronically signed by HANNA Trevizo on 1/10/2025 at 11:09 AM

## 2025-01-10 NOTE — DISCHARGE INSTR - DIET

## 2025-01-10 NOTE — CONSULTS
Héctor is a 59-year-old male past medical history of MRDD, schizophrenia, failure to thrive, hypertension, from an ECF, dementia  He was admitted to [facility] 11/1 with complaints of due to extreme aggression and altered mental status, not acting himself.  Supposedly the patient has a malignant neoplasm of the lung and hospice was on board but it sounds like this was discontinued 12/2024 for some reason.  Patient's POA is a legal guardian.  CODE STATUS is DNR CC  During his stay he does not look like there has been any known cause to his acute metabolic encephalopathy.  He did have an TAMARA, no UTI found.    Psychiatry is involved in his care and have been adjusting medications.    Patient refused exam    He is not in acute distress    He is on 2 L of O2    CT chest showed small volume PE in right lower lobe and possibility of a left upper lobe small volume PE, no right heart strain noted, bibasilar atelectasis    Oncology is consulted given malignant neoplasm..  They are also consulted for the pulmonary embolism    No strong objection to discharge; he is in no acute distress.  I asked him on several different attempts to examine him and he refused, I did stress the need for exam given pulmonary embolism.     This was discussed with Dr. Arias, Dr. Arias discussed via telephone with Dr. Aquino      Clinical status discussed with Jb Jones.  APRN-CNP    Patient refused exam    Spoke to Dr. Aquino… Primary team has also consulted oncology for the pulmonary embolism as well.  They may choose indefinite coagulation given the patient's malignancy.  We were told that the patient is not going to have anything done for the malignancy  There for recommendations to oncology given the patient's malignancy.    As we were not allowed to examine the patient, discharge plans per other services.    Updated Dr. Miles Arias MD

## 2025-01-10 NOTE — PROGRESS NOTES
Clozapine ANC Monitoring  Date Result Dose Comments   1/10/25 4200 50 mg bid  Weekly monitoring  Next ANC due 1/17/25      Adina Jules, PharmD  PGY-2 Psychiatric Pharmacy Resident    1/10/2025   7:42 AM

## 2025-01-10 NOTE — PLAN OF CARE
Problem: Safety - Medical Restraint  Goal: Remains free of injury from restraints (Restraint for Interference with Medical Device)  Description: INTERVENTIONS:  1. Determine that other, less restrictive measures have been tried or would not be effective before applying the restraint  2. Evaluate the patient's condition at the time of restraint application  3. Inform patient/family regarding the reason for restraint  4. Q2H: Monitor safety, psychosocial status, comfort, nutrition and hydration  Outcome: Adequate for Discharge     Problem: Discharge Planning  Goal: Discharge to home or other facility with appropriate resources  Outcome: Adequate for Discharge  Flowsheets (Taken 1/10/2025 2294)  Discharge to home or other facility with appropriate resources:   Identify barriers to discharge with patient and caregiver   Arrange for needed discharge resources and transportation as appropriate   Identify discharge learning needs (meds, wound care, etc)   Refer to discharge planning if patient needs post-hospital services based on physician order or complex needs related to functional status, cognitive ability or social support system     Problem: Skin/Tissue Integrity  Goal: Absence of new skin breakdown  Description: 1.  Monitor for areas of redness and/or skin breakdown  2.  Assess vascular access sites hourly  3.  Every 4-6 hours minimum:  Change oxygen saturation probe site  4.  Every 4-6 hours:  If on nasal continuous positive airway pressure, respiratory therapy assess nares and determine need for appliance change or resting period.  Outcome: Adequate for Discharge     Problem: Safety - Adult  Goal: Free from fall injury  Outcome: Adequate for Discharge  Flowsheets (Taken 1/10/2025 1966)  Free From Fall Injury: Instruct family/caregiver on patient safety     Problem: Pain  Goal: Verbalizes/displays adequate comfort level or baseline comfort level  Outcome: Adequate for Discharge     Problem: ABCDS Injury  -- DO NOT REPLY / DO NOT REPLY ALL --  -- Message is from the [facility]--    Patient is requesting a medication refill - medication is on active list, but patient is requesting a change to the medication prescription    Change requested: the [facility] pharmacist [place] (not [facility] it was sent there on 9-25-20) has some clinical questions for NP Maru Duong before he refills this prescription for the patient tomorrow as she's requesting.      He wants to speak to Ms Duong or someone else TODAY, trying to save the patient some money also & there was no answer when I backlined, thanks.    RX Name and Dose: Metoprolol Succinate 50 MG Capsule ER 24 Hour Sprinkle 50 mg, Oral, AT BEDTIME Summary: Take 50 mg by mouth at bedtime. For blood pressure and heart rate.  Eprescribe, Disp-90 capsule,R-3   Dose, Frequency: 50 mg, AT BEDTIME    Duration: 90 days    Pharmacy  [facility] [address] [place] [address] [address]    Patient confirmed the above pharmacy as correct?  Yes    Caller Information       Type Contact Phone    09/25/2020 07:35 PM CDT Phone (Incoming) Jeovany Bynumomayra HIEU (Self) [phone] (M)    09/28/2020 12:56 PM CDT Phone (Incoming) [facility] [address] [place] [address] [facility] [phone]     LIBRADO=PHARMACIST        Alternative phone number:na    Turnaround time given to caller:   \"This message will be sent to [state Provider's name]. The clinical team will fulfill your request as soon as they review your message.\"   Assessment  Goal: Absence of physical injury  Outcome: Adequate for Discharge  Flowsheets (Taken 1/10/2025 3036)  Absence of Physical Injury: Implement safety measures based on patient assessment

## 2025-01-11 LAB
ECHO AO ROOT DIAM: 2.7 CM
ECHO AO ROOT INDEX: 1.44 CM/M2
ECHO AV AREA PEAK VELOCITY: 2.1 CM2
ECHO AV AREA VTI: 1.9 CM2
ECHO AV AREA/BSA PEAK VELOCITY: 1.1 CM2/M2
ECHO AV AREA/BSA VTI: 1 CM2/M2
ECHO AV MEAN GRADIENT: 2 MMHG
ECHO AV MEAN VELOCITY: 0.6 M/S
ECHO AV PEAK GRADIENT: 3 MMHG
ECHO AV PEAK VELOCITY: 0.9 M/S
ECHO AV VELOCITY RATIO: 0.78
ECHO AV VTI: 20.2 CM
ECHO BSA: 1.86 M2
ECHO LA DIAMETER INDEX: 1.23 CM/M2
ECHO LA DIAMETER: 2.3 CM
ECHO LA TO AORTIC ROOT RATIO: 0.85
ECHO LV E' LATERAL VELOCITY: 8.49 CM/S
ECHO LV E' SEPTAL VELOCITY: 8.49 CM/S
ECHO LV EF PHYSICIAN: 55 %
ECHO LV FRACTIONAL SHORTENING: 39 % (ref 28–44)
ECHO LV INTERNAL DIMENSION DIASTOLE INDEX: 2.03 CM/M2
ECHO LV INTERNAL DIMENSION DIASTOLIC: 3.8 CM (ref 4.2–5.9)
ECHO LV INTERNAL DIMENSION SYSTOLIC INDEX: 1.23 CM/M2
ECHO LV INTERNAL DIMENSION SYSTOLIC: 2.3 CM
ECHO LV IVSD: 1.2 CM (ref 0.6–1)
ECHO LV MASS 2D: 153.2 G (ref 88–224)
ECHO LV MASS INDEX 2D: 81.9 G/M2 (ref 49–115)
ECHO LV POSTERIOR WALL DIASTOLIC: 1.2 CM (ref 0.6–1)
ECHO LV RELATIVE WALL THICKNESS RATIO: 0.63
ECHO LVOT AREA: 2.8 CM2
ECHO LVOT AV VTI INDEX: 0.67
ECHO LVOT DIAM: 1.9 CM
ECHO LVOT MEAN GRADIENT: 1 MMHG
ECHO LVOT PEAK GRADIENT: 2 MMHG
ECHO LVOT PEAK VELOCITY: 0.7 M/S
ECHO LVOT STROKE VOLUME INDEX: 20.5 ML/M2
ECHO LVOT SV: 38.3 ML
ECHO LVOT VTI: 13.5 CM
ECHO MV A VELOCITY: 0.59 M/S
ECHO MV E DECELERATION TIME (DT): 232 MS
ECHO MV E VELOCITY: 0.59 M/S
ECHO MV E/A RATIO: 1
ECHO MV E/E' LATERAL: 6.95
ECHO MV E/E' RATIO (AVERAGED): 6.95
ECHO MV E/E' SEPTAL: 6.95
ECHO RV TAPSE: 1.1 CM (ref 1.7–?)

## 2025-01-12 LAB
MICROORGANISM SPEC CULT: NORMAL
MICROORGANISM SPEC CULT: NORMAL
SERVICE CMNT-IMP: NORMAL
SERVICE CMNT-IMP: NORMAL
SPECIMEN DESCRIPTION: NORMAL
SPECIMEN DESCRIPTION: NORMAL

## 2025-01-13 NOTE — DISCHARGE SUMMARY
IN-PATIENT SERVICE   [facility] Internal Medicine    Discharge Summary     Patient ID: Héctor Tomas  :  1965   MRN: [id number]     ACCOUNT:  [id number]   Patient's PCP: Luciano Acosta MD  Admit Date: 2025   Discharge Date: 2025    Length of Stay: 8  Code Status:  Prior  Admitting Physician: Anna Aquino MD  Discharge Physician: Anna Aquino MD     Active Discharge Diagnoses:     Primary Problem  AMS (altered mental status)      Hospital Problems  Active Hospital Problems    Diagnosis Date Noted   • Pulmonary embolus (HCC) [I26.99] 01/10/2025   • Palliative care encounter [Z51.5] 2025   • AMS (altered mental status) [R41.82] 2025   • Acute delirium [R41.0] 2025   • Dementia (HCC) [F03.90] 2025   • Acute psychosis (HCC) [F23] 2025   • Intellectual disability [F79] 2020       Admission Condition:  fair     Discharged Condition: fair    Hospital Stay:     Hospital Course:  Héctor Tomas is a 59 y.o. male who was admitted for the management of AMS (altered mental status) , presented to ER with Aggressive Behavior    Héctor Tomas is a 59 y.o. Non- / non  male who presents with Aggressive Behavior   And reports that patient is not acting himself.  He is admitted to the hospital for the management of AMS (altered mental status).     Patient's medical history significant for schizophrenia, mentally disabled, failure to thrive in adult, and hypertension.     At time of exam, patient is unable to provide any input into HPI.  When asked where we are, patient responded back asking, \"where are we?\"  Patient states that it is December but does not answer any further questions.     Telephone call placed to patient's ECF.  According to the nurse caring for the patient, patient has become extremely aggressive with staff over the past week.  Nurse reports that this is a new behavior for the patient, as he has never been aggressive with

## (undated) DEVICE — CONNECTOR TBNG AUX H2O JET DISP FOR OLY 160/180 SER

## (undated) DEVICE — YANKAUER,BULB TIP,W/O VENT,RIGID,STERILE: Brand: MEDLINE

## (undated) DEVICE — FORCEPS BX L240CM JAW DIA2.4MM ORNG L CAP W/ NDL DISP RAD

## (undated) DEVICE — GOWN,POLY REINFORCED,LG: Brand: MEDLINE

## (undated) DEVICE — FORCEPS BX L240CM JAW DIA2.8MM L CAP W/ NDL MIC MESH TOOTH

## (undated) DEVICE — 4-PORT MANIFOLD: Brand: NEPTUNE 2

## (undated) DEVICE — BASIN EMSIS 700ML GRAPHITE PLAS KID SHP GRAD

## (undated) DEVICE — Device: Brand: DEFENDO VALVE AND CONNECTOR KIT

## (undated) DEVICE — TUBING INSUFFLATION CAP W/ EXT CARBON DIOX ENDO SMARTCAP

## (undated) DEVICE — NEEDLE SYR 18GA L1.5IN RED PLAS HUB S STL BLNT FILL W/O

## (undated) DEVICE — BITEBLOCK 54FR W/ DENT RIM BLOX

## (undated) DEVICE — GAUZE,SPONGE,3"X3",4PLY,NS,LF: Brand: MEDLINE

## (undated) DEVICE — TUBING, SUCTION, 3/16" X 10', STRAIGHT: Brand: MEDLINE

## (undated) DEVICE — SYRINGE MED 50ML LUERLOCK TIP

## (undated) DEVICE — FLUFF UNDERPAD,MODERATE: Brand: WINGS

## (undated) DEVICE — ADAPTER,CATHETER/SYRINGE/LUER,STERILE: Brand: MEDLINE

## (undated) DEVICE — JELLY,LUBE,STERILE,FLIP TOP,TUBE,2-OZ: Brand: MEDLINE

## (undated) DEVICE — GOWN NONREINF XLG: Brand: MEDLINE INDUSTRIES, INC.

## (undated) DEVICE — CANNULA IV 18GA L15IN BLNT FILL LUERLOCK HUB MJCT